# Patient Record
Sex: FEMALE | Race: WHITE | Employment: UNEMPLOYED | RURAL
[De-identification: names, ages, dates, MRNs, and addresses within clinical notes are randomized per-mention and may not be internally consistent; named-entity substitution may affect disease eponyms.]

---

## 2017-01-05 RX ORDER — POTASSIUM CHLORIDE 1500 MG/1
20 TABLET, FILM COATED, EXTENDED RELEASE ORAL DAILY
Qty: 30 TAB | Refills: 5 | Status: SHIPPED | OUTPATIENT
Start: 2017-01-05 | End: 2017-03-02

## 2017-01-17 ENCOUNTER — TELEPHONE (OUTPATIENT)
Dept: FAMILY MEDICINE CLINIC | Age: 72
End: 2017-01-17

## 2017-01-18 ENCOUNTER — TELEPHONE (OUTPATIENT)
Dept: FAMILY MEDICINE CLINIC | Age: 72
End: 2017-01-18

## 2017-01-26 ENCOUNTER — DOCUMENTATION ONLY (OUTPATIENT)
Dept: FAMILY MEDICINE CLINIC | Age: 72
End: 2017-01-26

## 2017-01-26 ENCOUNTER — TELEPHONE (OUTPATIENT)
Dept: FAMILY MEDICINE CLINIC | Age: 72
End: 2017-01-26

## 2017-01-26 NOTE — PROGRESS NOTES
Spoke with the patients / RN for her health insurance plan she stated Mrs Ang Gupta wants referral for hearing loss,podiatrist and wants to have a blood pressure cuff at home I told her she needs to discuss these issues with Dr Kelton Esquivel  And get referrals at that time it is required by the insurance that a face to face documentation note for all referrals. She stated to the doctor can charge a plan of care to her insurance using code #.  The nurse will get Mrs Ang Gupta to make appointment to discuss issues

## 2017-03-02 ENCOUNTER — OFFICE VISIT (OUTPATIENT)
Dept: FAMILY MEDICINE CLINIC | Age: 72
End: 2017-03-02

## 2017-03-02 VITALS
WEIGHT: 183.6 LBS | SYSTOLIC BLOOD PRESSURE: 110 MMHG | HEIGHT: 66 IN | HEART RATE: 99 BPM | BODY MASS INDEX: 29.51 KG/M2 | TEMPERATURE: 98.1 F | OXYGEN SATURATION: 98 % | RESPIRATION RATE: 16 BRPM | DIASTOLIC BLOOD PRESSURE: 66 MMHG

## 2017-03-02 DIAGNOSIS — R60.0 LOCALIZED EDEMA: ICD-10-CM

## 2017-03-02 DIAGNOSIS — C44.90 SKIN CANCER: ICD-10-CM

## 2017-03-02 DIAGNOSIS — I10 ESSENTIAL HYPERTENSION: Primary | ICD-10-CM

## 2017-03-02 DIAGNOSIS — M79.89 RIGHT LEG SWELLING: ICD-10-CM

## 2017-03-02 DIAGNOSIS — E78.5 HYPERLIPIDEMIA, UNSPECIFIED HYPERLIPIDEMIA TYPE: ICD-10-CM

## 2017-03-02 DIAGNOSIS — M17.9 OSTEOARTHRITIS OF KNEE, UNSPECIFIED LATERALITY, UNSPECIFIED OSTEOARTHRITIS TYPE: ICD-10-CM

## 2017-03-02 DIAGNOSIS — M81.0 OSTEOPOROSIS: ICD-10-CM

## 2017-03-02 DIAGNOSIS — F31.70 BIPOLAR AFFECTIVE DISORDER IN REMISSION (HCC): ICD-10-CM

## 2017-03-02 DIAGNOSIS — I87.2 STASIS DERMATITIS OF BOTH LEGS: ICD-10-CM

## 2017-03-02 DIAGNOSIS — L70.9 ADULT ACNE: ICD-10-CM

## 2017-03-02 DIAGNOSIS — R73.9 ELEVATED BLOOD SUGAR: ICD-10-CM

## 2017-03-02 DIAGNOSIS — L30.9 DERMATITIS: ICD-10-CM

## 2017-03-02 DIAGNOSIS — Z85.828 PERSONAL HISTORY OF SKIN CANCER: ICD-10-CM

## 2017-03-02 DIAGNOSIS — I87.2 VENOUS INSUFFICIENCY: ICD-10-CM

## 2017-03-02 RX ORDER — TRETINOIN 1 MG/G
CREAM TOPICAL
Qty: 20 G | Refills: 3 | Status: SHIPPED | OUTPATIENT
Start: 2017-03-02 | End: 2017-11-01 | Stop reason: SDUPTHER

## 2017-03-02 RX ORDER — ESZOPICLONE 2 MG/1
2 TABLET, FILM COATED ORAL
COMMUNITY
Start: 2017-02-27 | End: 2017-06-19

## 2017-03-02 RX ORDER — AMMONIUM LACTATE 12 G/100G
LOTION TOPICAL
Qty: 1 BOTTLE | Refills: 5 | Status: SHIPPED | OUTPATIENT
Start: 2017-03-02

## 2017-03-02 RX ORDER — POLYETHYLENE GLYCOL 3350 17 G/17G
POWDER, FOR SOLUTION ORAL
COMMUNITY
Start: 2017-01-23 | End: 2017-05-01

## 2017-03-02 RX ORDER — LISINOPRIL 20 MG/1
20 TABLET ORAL DAILY
Qty: 90 TAB | Refills: 1 | Status: SHIPPED | OUTPATIENT
Start: 2017-03-02 | End: 2018-03-30 | Stop reason: SDUPTHER

## 2017-03-02 RX ORDER — CYCLOSPORINE 0.5 MG/ML
EMULSION OPHTHALMIC
COMMUNITY
Start: 2017-01-09 | End: 2017-05-01

## 2017-03-02 RX ORDER — PREDNISOLONE ACETATE 10 MG/ML
SUSPENSION/ DROPS OPHTHALMIC
COMMUNITY
Start: 2017-02-04 | End: 2017-05-01

## 2017-03-02 RX ORDER — POTASSIUM CHLORIDE 20 MEQ/1
20 TABLET, EXTENDED RELEASE ORAL DAILY
Qty: 90 TAB | Refills: 1 | Status: SHIPPED | OUTPATIENT
Start: 2017-03-02 | End: 2018-03-28 | Stop reason: SDUPTHER

## 2017-03-02 RX ORDER — POTASSIUM CHLORIDE 20 MEQ/1
TABLET, EXTENDED RELEASE ORAL
COMMUNITY
Start: 2017-02-27 | End: 2017-03-02 | Stop reason: SDUPTHER

## 2017-03-02 RX ORDER — OFLOXACIN 3 MG/ML
SOLUTION/ DROPS OPHTHALMIC
COMMUNITY
Start: 2017-02-01 | End: 2017-03-02

## 2017-03-02 RX ORDER — MELATONIN
2000 DAILY
Qty: 180 TAB | Refills: 1 | Status: SHIPPED | OUTPATIENT
Start: 2017-03-02 | End: 2018-04-12 | Stop reason: SDUPTHER

## 2017-03-02 RX ORDER — DIPHENHYDRAMINE HCL 25 MG
600 TABLET,DISINTEGRATING ORAL DAILY
Qty: 90 CAP | Refills: 1 | Status: SHIPPED | OUTPATIENT
Start: 2017-03-02 | End: 2018-04-12

## 2017-03-02 RX ORDER — BISACODYL 5 MG
TABLET, DELAYED RELEASE (ENTERIC COATED) ORAL
COMMUNITY
Start: 2017-01-25 | End: 2017-05-01

## 2017-03-02 RX ORDER — HYDROCODONE BITARTRATE AND ACETAMINOPHEN 5; 325 MG/1; MG/1
TABLET ORAL
Qty: 60 TAB | Refills: 0 | Status: SHIPPED | OUTPATIENT
Start: 2017-03-02 | End: 2017-05-01 | Stop reason: SDUPTHER

## 2017-03-02 RX ORDER — TORSEMIDE 20 MG/1
TABLET ORAL
Qty: 10 TAB | Refills: 1 | Status: SHIPPED | OUTPATIENT
Start: 2017-03-02 | End: 2017-08-30 | Stop reason: SDUPTHER

## 2017-03-02 RX ORDER — SPIRONOLACTONE 50 MG/1
50 TABLET, FILM COATED ORAL DAILY
Qty: 90 TAB | Refills: 1 | Status: SHIPPED | OUTPATIENT
Start: 2017-03-02 | End: 2017-10-23 | Stop reason: SDUPTHER

## 2017-03-02 RX ORDER — DIPHENHYDRAMINE HCL 25 MG
TABLET,DISINTEGRATING ORAL
COMMUNITY
Start: 2017-01-20 | End: 2017-03-02 | Stop reason: SDUPTHER

## 2017-03-02 RX ORDER — MELATONIN
2000
COMMUNITY
End: 2017-03-02 | Stop reason: SDUPTHER

## 2017-03-02 NOTE — PATIENT INSTRUCTIONS
Continue current medications    Work on diet and exercise    Fasting Lab work this month    Keep planned follow up visit here, derm, GYN, vasc surgeon, psyc, orthop

## 2017-03-02 NOTE — MR AVS SNAPSHOT
Visit Information Date & Time Provider Department Dept. Phone Encounter #  
 3/2/2017  3:30 PM Paige Wells MD [address] [place] [address] [phone] [id number] Follow-up Instructions Return in about 6 months (around 9/2/2017). Upcoming Health Maintenance Date Due  
 GLAUCOMA SCREENING Q2Y 3/9/2010 FOBT Q 1 YEAR AGE 50-75 3/8/2017 INFLUENZA AGE 9 TO ADULT 4/30/2017* MEDICARE YEARLY EXAM 3/9/2017 BREAST CANCER SCRN MAMMOGRAM 9/6/2018 DTaP/Tdap/Td series (2 - Td) 2/18/2025 *Topic was postponed. The date shown is not the original due date. Allergies as of 3/2/2017  Review Complete On: 3/2/2017 By: Paige Wells MD  
  
 Severity Noted Reaction Type Reactions Levaquin [Levofloxacin]  06/10/2016    Other (comments) Muscle weakness Percocet [Oxycodone-acetaminophen]  05/20/2016    Other (comments)  
 halucinations Statins-hmg-coa Reductase Inhibitors  06/10/2016    Other (comments) Muscle weakness Current Immunizations  Reviewed on 3/5/2014 Name Date Influenza Vaccine 9/29/2015, 10/10/2013 Influenza Vaccine (Quad) 10/8/2014  4:53 PM  
 Influenza Vaccine (Quad) PF 10/27/2015 Pneumococcal Conjugate (PCV-13) 3/8/2016 Pneumococcal Polysaccharide (PPSV-23) 10/1/2014 Td 10/10/2013 Tdap 2/18/2015 Not reviewed this visit You Were Diagnosed With   
  
 Codes Comments Essential hypertension    -  Primary ICD-10-CM: I10 
ICD-9-CM: 401.9 Hyperlipidemia, unspecified hyperlipidemia type     ICD-10-CM: E78.5 ICD-9-CM: 272.4 Elevated blood sugar     ICD-10-CM: R73.9 ICD-9-CM: 790.29 Venous insufficiency     ICD-10-CM: I87.2 ICD-9-CM: 459.81 Bipolar affective disorder in remission [facility])     ICD-10-CM: F31.70 ICD-9-CM: 296.80 Osteoarthritis of knee, unspecified laterality, unspecified osteoarthritis type     ICD-10-CM: M17.9 ICD-9-CM: 715.36 Osteoporosis     ICD-10-CM: M81.0 ICD-9-CM: 733.00 Skin cancer     ICD-10-CM: C44.90 ICD-9-CM: 173.90 Dermatitis     ICD-10-CM: L30.9 ICD-9-CM: 692.9 Adult acne     ICD-10-CM: L70.9 ICD-9-CM: 706.1 Stasis dermatitis of both legs     ICD-10-CM: I83.11, I83.12 
ICD-9-CM: 454.1 Localized edema     ICD-10-CM: R60.0 ICD-9-CM: 735. 3 Right leg swelling     ICD-10-CM: M79.89 ICD-9-CM: 729.81 Personal history of skin cancer     ICD-10-CM: G94.710 ICD-9-CM: V10.83 Vitals BP  
  
  
  
  
  
 110/66 (BP 1 Location: Right arm, BP Patient Position: Sitting) BMI and BSA Data Body Mass Index Body Surface Area  
 29.63 kg/m 2 1.97 m 2 Preferred Pharmacy Pharmacy Name Phone [facility],[address], [address] [place] [address]  [phone] Your Updated Medication List  
  
   
This list is accurate as of: 3/2/17  4:39 PM.  Always use your most recent med list.  
  
  
  
  
 ammonium lactate 12 % lotion Commonly known as:  LAC-HYDRIN  
rub in to affected area well twice daily  
  
 bisacodyl 5 mg EC tablet Commonly known as:  DULCOLAX  
  
 calcium carbonate-vitamin D3 600 mg(1,500mg) -800 unit Tab Take 600 mg by mouth daily. cholecalciferol 1,000 unit tablet Commonly known as:  VITAMIN D3 Take 2 Tabs by mouth daily. conjugated estrogens 0.625 mg/gram vaginal cream  
Commonly known as:  PREMARIN Insert 0.5g vaginally on Monday and Thursday of each week. EFFEXOR  mg capsule Generic drug:  venlafaxine-SR Take  by mouth daily. eszopiclone 2 mg tablet Commonly known as:  LUNESTA  
  
 fenofibrate nanocrystallized 145 mg tablet Commonly known as:  TRICOR  
TAKE ONE TABLET BY MOUTH DAILY HYDROcodone-acetaminophen 5-325 mg per tablet Commonly known as:  Radha President One tab twice daily if needed  
  
 lisinopril 20 mg tablet Commonly known as:  Henry Shutters Take 1 Tab by mouth daily. polyethylene glycol 17 gram/dose powder Commonly known as:  MIRALAX  
  
 potassium chloride 20 mEq tablet Commonly known as:  K-DUR, KLOR-CON Take 1 Tab by mouth daily. prednisoLONE acetate 1 % ophthalmic suspension Commonly known as:  PRED FORTE  
  
 RESTASIS 0.05 % ophthalmic emulsion Generic drug:  cycloSPORINE  
  
 RITALIN 20 mg tablet Generic drug:  methylphenidate Take 20 mg by mouth two (2) times a day. rOPINIRole 1 mg tablet Commonly known as:  Annalee Ness Take 0.5 Tabs by mouth three (3) times daily. spironolactone 50 mg tablet Commonly known as:  ALDACTONE Take 1 Tab by mouth daily. torsemide 20 mg tablet Commonly known as:  DEMADEX Take 1 tab for leg swelling PRN Do not take more than 1 tab per week  Indications: Edema  
  
 tretinoin 0.1 % topical cream  
Commonly known as:  RETIN-A  
APPLY TO AFFECTED AREA EVERY NIGHT  
  
 VISTARIL 25 mg capsule Generic drug:  hydrOXYzine pamoate Take 25 mg by mouth three (3) times daily as needed for Itching. Prescriptions Printed Refills HYDROcodone-acetaminophen (NORCO) 5-325 mg per tablet 0 Sig: One tab twice daily if needed Class: Print  
 torsemide (DEMADEX) 20 mg tablet 1 Sig: Take 1 tab for leg swelling PRN Do not take more than 1 tab per week  Indications: Edema Class: Print Prescriptions Sent to Pharmacy Refills  
 ammonium lactate (LAC-HYDRIN) 12 % lotion 5 Sig: rub in to affected area well twice daily Class: Normal  
 Pharmacy: [address] [place] [address] Dr Ph #: [phone]  
 calcium carbonate-vitamin D3 600 mg(1,500mg) -800 unit tab 1 Sig: Take 600 mg by mouth daily. Class: Normal  
 Pharmacy: [address] [place] [address] Dr Ph #: [phone] Route: Oral  
 cholecalciferol (VITAMIN D3) 1,000 unit tablet 1 Sig: Take 2 Tabs by mouth daily. Class: Normal  
 Pharmacy: [address] [place] [address] Dr Ph #: [phone] Route: Oral  
 lisinopril (PRINIVIL, ZESTRIL) 20 mg tablet 1 Sig: Take 1 Tab by mouth daily. Class: Normal  
 Pharmacy: [address] [place] [address] Dr Ph #: [phone] Route: Oral  
 potassium chloride (K-DUR, KLOR-CON) 20 mEq tablet 1 Sig: Take 1 Tab by mouth daily. Class: Normal  
 Pharmacy: [address] [place] [address] Dr Ph #: [phone] Route: Oral  
 spironolactone (ALDACTONE) 50 mg tablet 1 Sig: Take 1 Tab by mouth daily. Class: Normal  
 Pharmacy: [address] [place] [address] Dr Ph #: [phone] Route: Oral  
 tretinoin (RETIN-A) 0.1 % topical cream 3 Sig: APPLY TO AFFECTED AREA EVERY NIGHT Class: Normal  
 Pharmacy: [address] [place] [address] Dr Ph #: [phone] Follow-up Instructions Return in about 6 months (around 9/2/2017). To-Do List   
 04/01/2017 Lab:  ALT   
  
 04/01/2017 Lab:  AST   
  
 04/01/2017 Lab:  CBC WITH AUTOMATED DIFF   
  
 04/01/2017 Lab:  HEMOGLOBIN A1C WITH EAG   
  
 04/01/2017 Lab:  LIPID PANEL   
  
 04/01/2017 Lab:  METABOLIC PANEL, BASIC   
  
 04/01/2017 Lab:  TSH   
  
 04/01/2017 Lab:  URINALYSIS W/ RFLX MICROSCOPIC   
  
 04/01/2017 Lab:  VITAMIN D, 25 HYDROXY Patient Instructions Continue current medications Work on diet and exercise Fasting Lab work this month Keep planned follow up visit here, derm, GYN, vasc surgeon, psyc, orthop Introducing [facility]! [facility] introduces [email or web address] patient portal. Now you can access parts of your medical record, email your doctor's office, and request medication refills online. 1. In your internet browser, go to [email or web address] 2. Click on the First Time User? Click Here link in the Sign In box. You will see the New Member Sign Up page. 3. Enter your [email or web address] Access Code exactly as it appears below.  You will not need to use this code after youve completed the sign-up process. If you do not sign up before the expiration date, you must request a new code. · LearnSomething Access Code: [email or web address] Expires: 3/19/2017  3:00 PM 
 
4. Enter the last four digits of your Social Security Number (xxxx) and Date of Birth (mm/dd/yyyy) as indicated and click Submit. You will be taken to the next sign-up page. 5. Create a LearnSomething ID. This will be your LearnSomething login ID and cannot be changed, so think of one that is secure and easy to remember. 6. Create a LearnSomething password. You can change your password at any time. 7. Enter your Password Reset Question and Answer. This can be used at a later time if you forget your password. 8. Enter your e-mail address. You will receive e-mail notification when new information is available in [address]. 9. Click Sign Up. You can now view and download portions of your medical record. 10. Click the Download Summary menu link to download a portable copy of your medical information. If you have questions, please visit the Frequently Asked Questions section of the LearnSomething website. Remember, LearnSomething is NOT to be used for urgent needs. For medical emergencies, dial 911. Now available from your iPhone and Android! Please provide this summary of care documentation to your next provider. Your primary care clinician is listed as Moira Osei. If you have any questions after today's visit, please call [phone].

## 2017-03-02 NOTE — PROGRESS NOTES
Chantel Aguirre is a 70 y.o. female who presents to the office today with the following:  Chief Complaint   Patient presents with    Medication Refill     wants #90 day supply of meds       Allergies   Allergen Reactions    Levaquin [Levofloxacin] Other (comments)     Muscle weakness    Percocet [Oxycodone-Acetaminophen] Other (comments)     halucinations      Statins-Hmg-Coa Reductase Inhibitors Other (comments)     Muscle weakness       Current Outpatient Prescriptions   Medication Sig    RESTASIS 0.05 % ophthalmic emulsion     eszopiclone (LUNESTA) 2 mg tablet     prednisoLONE acetate (PRED FORTE) 1 % ophthalmic suspension     ammonium lactate (LAC-HYDRIN) 12 % lotion rub in to affected area well twice daily    calcium carbonate-vitamin D3 600 mg(1,500mg) -800 unit tab Take 600 mg by mouth daily.  cholecalciferol (VITAMIN D3) 1,000 unit tablet Take 2 Tabs by mouth daily.  HYDROcodone-acetaminophen (NORCO) 5-325 mg per tablet One tab twice daily if needed    lisinopril (PRINIVIL, ZESTRIL) 20 mg tablet Take 1 Tab by mouth daily.  potassium chloride (K-DUR, KLOR-CON) 20 mEq tablet Take 1 Tab by mouth daily.  spironolactone (ALDACTONE) 50 mg tablet Take 1 Tab by mouth daily.  torsemide (DEMADEX) 20 mg tablet Take 1 tab for leg swelling PRN  Do not take more than 1 tab per week  Indications: Edema    tretinoin (RETIN-A) 0.1 % topical cream APPLY TO AFFECTED AREA EVERY NIGHT    methylphenidate (RITALIN) 20 mg tablet Take 20 mg by mouth two (2) times a day.  hydrOXYzine pamoate (VISTARIL) 25 mg capsule Take 25 mg by mouth three (3) times daily as needed for Itching.  rOPINIRole (REQUIP) 1 mg tablet Take 0.5 Tabs by mouth three (3) times daily.  conjugated estrogens (PREMARIN) 0.625 mg/gram vaginal cream Insert 0.5g vaginally on Monday and Thursday of each week.     fenofibrate nanocrystallized (TRICOR) 145 mg tablet TAKE ONE TABLET BY MOUTH DAILY    venlafaxine-SR (EFFEXOR XR) 150 mg capsule Take  by mouth daily.  bisacodyl (DULCOLAX) 5 mg EC tablet     polyethylene glycol (MIRALAX) 17 gram/dose powder      No current facility-administered medications for this visit. Past Medical History:   Diagnosis Date    Actinic keratosis     ADHD (attention deficit hyperactivity disorder)     Arthritis     Bipolar affective disorder ([facility] [address]     Dr. Aissatou Cortez Colon cancer [facility])     polyps on scope 2/17    Depression     GERD (gastroesophageal reflux disease)     Insomnia     Osteoporosis     Dexa 9/6/16       Past Surgical History:   Procedure Laterality Date    HX BACK SURGERY      HX COLECTOMY  1982    HX GI      HX HEENT      HX MALIGNANT SKIN LESION EXCISION         History   Smoking Status    Former Smoker    Packs/day: 1.00    Quit date: 3/5/1970   Smokeless Tobacco    Never Used       Family History   Problem Relation Age of Onset   Gladystine Mower Cancer Father      melanoma, larynx    Cancer Mother     Hypertension Mother     Cancer Maternal Aunt      pancreatic    Cancer Maternal Grandmother      pancreatic         History of Present Illness:  Patient here for ongoing medical follow-up    Patient with long and complex medical history patient with a history of chronic lower extremity edema and venous stasis. She has been evaluated by surgery in the past for this. She has had workup in the past including normal MARISSA exam.  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 She is currently on Spironolactone daily and as needed torsemide. She only needs torsemide once or twice a week. She has not been wearing support hose because they are difficult for her to put on . Akhil Sands She does complain of increasing swelling and irritation of her legs as the day goes on. History of venous ulcers but no current issues there. Since the last visit we did do a vascular ultrasound which did show some venous reflux. No evidence of DVTs.   We did have her seen by vascular surgery. I do not have that consult back yet. She was told that her veins were \"not that bad \". Surgery not indicated. It sounds like they are doing some additional workup ultrasounds kidney tests etc. to rule out other etiologies. She has a follow-up with them      She has hypertension. On lisinopril. No current cardiac complaints. Most recent basic metabolic profile normal.  She has no cardiac complaints. Blood pressure is in good control on her current regimen. She is willing to have some lab work today    She has a history of hyperlipidemia. Intolerant to statins. Currently on TriCor. Most recent lipid panel acceptable with normal LFTs. We are continuing her medication and getting lab work today    Patient does have anxiety/bipolar disease associated with insomnia and ADHD. She does followed with psychiatry. They currently have her on Effexor, Ritalin as needed, Lunesta and Vistaril. .  Overall she has been stable. She has ongoing follow-up with her psychiatrist    She has remote history of skin cancer. She has had multiple skin lesions removed over the years. She usually follows with dermatology but has not been seen there in a while. She plans to call and make that appointment. The dermatologist did start her on Retin-A which has helped. Her primary care providers had been writing for this. She does plan to discuss this with her dermatologist as well. She also has some venous stasis dermatitis lower extremities. She uses Lac-Hydrin for that. She did ask if I could refill that today    She has a history of a malignant polyp removed from her colon in 1982. She needs periodic surveillance colonoscopies. Since the last visit she did follow through with surgery. She just had a colonoscopy. I do not have report back but patient stated she was told the colonoscopy was normal and she can go 5 years before her next scope    She does have a history of osteoporosis.   Confirmed on recent DEXA scan 2016. She is on vitamin D replacement. Calcium was recently started. She was not felt to be an acceptable candidate for Fosamax as she has extensive dental work and is going to be undergoing extractions. She did ask if I could call in calcium and vitamin D to her pharmacy so that could be covered with her insurance plan. I did so. I am checking vitamin D levels    She does have restless leg syndrome treated with Requip. She states this works quite well. She has chronic back pain secondary to lumbar spondylosis. She has had surgery on her back in the past for disc disease. She does have ongoing pain in her back. She has been intolerant to multiple pain medications. She is currently on Norco which is prescribed for 3 times a day but she does not take it  that often. She has been seen by pain management in the past.  Injections were not helpful. She would prefer not to drive to [place] for pain management. She did ask for refill today. According to  last refill for 60 tablets was in November. I did okay 60 tablets no refills. Patient did sign a pain contract today. She is aware of risk of sedation and driving restrictions with narcotic pain medication      She does have osteoarthritis in her knees. She is currently following with orthopedist.  They did recently give her a cortisone shot. She is involved in exercise program and it hopes that this will improve her pain to allow her to stop her pain medications    Remote history of GERD no current complaints    She has a history of cataracts. She tells me they are contemplating cataract surgery    History of elevated BG without diabetes. Repeat labs/A1c ordered    She has not seen GYN in a while. She has postmenopausal symptoms of vaginal dryness and some incontinence. Currently on Premarin vaginal cream.  She does not feel this is helping. She is interested in the vaginal ring. She has an intact uterus.   She would need to go on a combination of estrogen and Provera. Given her other medical issues I am not comfortable doing this at this point. She is seen GYN in the past and tells me she plans to call and schedule an appointment with them to discuss further. She was going to do this after the last visit but has not had a chance to do that yet. She says she will    Recent mammogram September 2016 normal    She has had a tetanus booster in 2005. She has had 2 pneumonia shots. She has had the flu shot this year. She recently got the Zostavax        Review of Systems:      Review of systems negative except as noted above    Physical Exam:  Visit Vitals    /66 (BP 1 Location: Right arm, BP Patient Position: Sitting)    Pulse 99    Temp 98.1 °F (36.7 °C) (Oral)    Resp 16    Ht 5' 6\" (1.676 m)    Wt 183 lb 9.6 oz (83.3 kg)    SpO2 98%    BMI 29.63 kg/m2     Vitals:    03/02/17 1552   BP: 110/66   BP 1 Location: Right arm   BP Patient Position: Sitting   Pulse: 99   Resp: 16   Temp: 98.1 °F (36.7 °C)   TempSrc: Oral   SpO2: 98%   Weight: 183 lb 9.6 oz (83.3 kg)   Height: 5' 6\" (1.676 m)    Patient no acute distress vital signs stable as above. Head is normocephalic  Patient dressed appropriately. Good eye contact. Did not appear anxious or distressed. No psychotic or suicidal ideations  External ears normal.  Ear canals normal.  TMs were clear  Eyes PERRLA. EOMs full. Sclera clear. Patient seeing her eye doctor regularly  Nose within normal limits. Nares  normal.  No significant congestion  OP mucosa normal, no obvious lesions. Pharynx normal.  No erythema or exudate. Structures midline. Poor dentition. Patient following with dentist  Neck no nodes no masses no bruits. No goiter  Chest was clear no wheezes rhonchi or rales. Good air exchange  Cor regular rate and rhythm no S3-S4 no murmurs  Abdomen obese no HSM no masses soft and was nontender  Low back revealed midline scar from previous surgery. Tenderness to palpation paraspinous muscles. Negative sitting straight leg raising sign  Extremities had trace pretibial edema. Some dependent rubor noted. Good pedal pulses and capillary refill. Dry skin but no ulcers        Complex patient with multiple medical issues. Greater than 40 minutes were spent in discussion with patient, exam and reviewing and refilling her medications. Overall she is medically stable at this point. She is going to continue her current medications. She will follow-up with all the above providers. I have ordered some additional lab work today. If all goes well I will see her back in 6 months  1. Essential hypertension  We will continue current medications. She did request a prescription for home blood pressure cuff and I wrote for this  - METABOLIC PANEL, BASIC; Future  - CBC WITH AUTOMATED DIFF; Future  - URINALYSIS W/ RFLX MICROSCOPIC; Future  - lisinopril (PRINIVIL, ZESTRIL) 20 mg tablet; Take 1 Tab by mouth daily. Dispense: 90 Tab; Refill: 1  - potassium chloride (K-DUR, KLOR-CON) 20 mEq tablet; Take 1 Tab by mouth daily. Dispense: 90 Tab; Refill: 1    2. Hyperlipidemia, unspecified hyperlipidemia type    - LIPID PANEL; Future  - AST; Future  - ALT; Future  - TSH; Future    3. Elevated blood sugar    - HEMOGLOBIN A1C WITH EAG; Future    4. Venous insufficiency  Patient will follow with vascular surgery  - ammonium lactate (LAC-HYDRIN) 12 % lotion; rub in to affected area well twice daily  Dispense: 1 Bottle; Refill: 5    5. Bipolar affective disorder in remission [facility])  Patient will follow with her psychiatrist    6. Osteoarthritis of knee, unspecified laterality, unspecified osteoarthritis type  Patient will follow with her orthopedist.    - HYDROcodone-acetaminophen (Vivienne Betts) 5-325 mg per tablet; One tab twice daily if needed  Dispense: 60 Tab; Refill: 0    7.  Osteoporosis  Continue current medications  - VITAMIN D, 25 HYDROXY; Future  - calcium carbonate-vitamin D3 600 mg(1,500mg) -800 unit tab; Take 600 mg by mouth daily. Dispense: 90 Cap; Refill: 1  - cholecalciferol (VITAMIN D3) 1,000 unit tablet; Take 2 Tabs by mouth daily. Dispense: 180 Tab; Refill: 1    8. Skin cancer  Patient states she is going to call and follow with her dermatologist    9. Dermatitis  **I have refilled her Lac-Hydrin as well as her tretinoin    10. Adult acne      11. Stasis dermatitis of both legs    - ammonium lactate (LAC-HYDRIN) 12 % lotion; rub in to affected area well twice daily  Dispense: 1 Bottle; Refill: 5    12. Localized edema  We will continue current medications  - spironolactone (ALDACTONE) 50 mg tablet; Take 1 Tab by mouth daily. Dispense: 90 Tab; Refill: 1  - torsemide (DEMADEX) 20 mg tablet; Take 1 tab for leg swelling PRN  Do not take more than 1 tab per week  Indications: Edema  Dispense: 10 Tab; Refill: 1    14. Personal history of skin cancer    - tretinoin (RETIN-A) 0.1 % topical cream; APPLY TO AFFECTED AREA EVERY NIGHT  Dispense: 20 g; Refill: 3      Patient Instructions   Continue current medications    Work on diet and exercise    Fasting Lab work this month    Keep planned follow up visit here, derm, GYN, vasc surgeon, psyc, orthop          Continue current therapy plan except for indicated above. Verbal and written instructions (see AVS) provided.  Patient expresses understanding of diagnosis and treatment plan. Follow-up Disposition:  Return in about 6 months (around 9/2/2017). Evelyn Remy.  Asya Herring MD

## 2017-05-01 ENCOUNTER — TELEPHONE (OUTPATIENT)
Dept: FAMILY MEDICINE CLINIC | Age: 72
End: 2017-05-01

## 2017-05-01 ENCOUNTER — OFFICE VISIT (OUTPATIENT)
Dept: FAMILY MEDICINE CLINIC | Age: 72
End: 2017-05-01

## 2017-05-01 VITALS
OXYGEN SATURATION: 94 % | HEIGHT: 66 IN | DIASTOLIC BLOOD PRESSURE: 70 MMHG | RESPIRATION RATE: 16 BRPM | BODY MASS INDEX: 29.35 KG/M2 | SYSTOLIC BLOOD PRESSURE: 110 MMHG | WEIGHT: 182.6 LBS | TEMPERATURE: 96.7 F | HEART RATE: 91 BPM

## 2017-05-01 DIAGNOSIS — M54.50 CHRONIC MIDLINE LOW BACK PAIN WITHOUT SCIATICA: ICD-10-CM

## 2017-05-01 DIAGNOSIS — L03.115 CELLULITIS OF RIGHT LOWER EXTREMITY: Primary | ICD-10-CM

## 2017-05-01 DIAGNOSIS — M17.9 OSTEOARTHRITIS OF KNEE, UNSPECIFIED LATERALITY, UNSPECIFIED OSTEOARTHRITIS TYPE: ICD-10-CM

## 2017-05-01 DIAGNOSIS — G89.29 CHRONIC MIDLINE LOW BACK PAIN WITHOUT SCIATICA: ICD-10-CM

## 2017-05-01 RX ORDER — ESTRADIOL 2 MG/1
2 RING VAGINAL
COMMUNITY
Start: 2017-03-30

## 2017-05-01 RX ORDER — HYDROCODONE BITARTRATE AND ACETAMINOPHEN 5; 325 MG/1; MG/1
TABLET ORAL
Qty: 45 TAB | Refills: 0 | Status: SHIPPED | OUTPATIENT
Start: 2017-05-01 | End: 2017-05-25 | Stop reason: SDUPTHER

## 2017-05-01 RX ORDER — DOXYCYCLINE 100 MG/1
100 TABLET ORAL 2 TIMES DAILY
Qty: 20 TAB | Refills: 0 | Status: SHIPPED | OUTPATIENT
Start: 2017-05-01 | End: 2017-05-11

## 2017-05-01 RX ORDER — CYCLOSPORINE 0.5 MG/ML
EMULSION OPHTHALMIC
COMMUNITY
Start: 2017-01-02 | End: 2018-01-02

## 2017-05-01 NOTE — PATIENT INSTRUCTIONS
Finish antibiotics  Keep elevated  Follow up if not improving    Continue current medications  Try and taper off Norco    Work on diet and exercise        Keep planned follow up visit

## 2017-05-01 NOTE — TELEPHONE ENCOUNTER
Called pt and she stated she was cleaning out trash outside, crab pots ect. Saturday and got several cuts and puncture wounds on her leg. Areas \"look very angry\" red, closed and not hot. She wanted an antibiotic or she would drive here this afternoon if you would like her too. This writer asked if she thought she could wait till tomorrow and she didn't think she could. Please advise.

## 2017-05-01 NOTE — PROGRESS NOTES
Cristina Dean is a 67 y.o. female who presents to the office today with the following:  Chief Complaint   Patient presents with    Puncture Wound     Both Legs. Allergies   Allergen Reactions    Levaquin [Levofloxacin] Other (comments)     Muscle weakness    Percocet [Oxycodone-Acetaminophen] Other (comments)     halucinations      Statins-Hmg-Coa Reductase Inhibitors Other (comments)     Muscle weakness       Current Outpatient Prescriptions   Medication Sig    ESTRING 2 mg (7.5 mcg /24 hour) vaginal ring Insert 2 mg into vagina.  cycloSPORINE (RESTASIS) 0.05 % ophthalmic emulsion RESTASIS 0.05 % EMUL    doxycycline (ADOXA) 100 mg tablet Take 1 Tab by mouth two (2) times a day for 10 days.  HYDROcodone-acetaminophen (NORCO) 5-325 mg per tablet One tab twice daily  Only if needed    eszopiclone (LUNESTA) 2 mg tablet     ammonium lactate (LAC-HYDRIN) 12 % lotion rub in to affected area well twice daily    calcium carbonate-vitamin D3 600 mg(1,500mg) -800 unit tab Take 600 mg by mouth daily.  cholecalciferol (VITAMIN D3) 1,000 unit tablet Take 2 Tabs by mouth daily.  lisinopril (PRINIVIL, ZESTRIL) 20 mg tablet Take 1 Tab by mouth daily.  potassium chloride (K-DUR, KLOR-CON) 20 mEq tablet Take 1 Tab by mouth daily.  spironolactone (ALDACTONE) 50 mg tablet Take 1 Tab by mouth daily.  torsemide (DEMADEX) 20 mg tablet Take 1 tab for leg swelling PRN  Do not take more than 1 tab per week  Indications: Edema    tretinoin (RETIN-A) 0.1 % topical cream APPLY TO AFFECTED AREA EVERY NIGHT    methylphenidate (RITALIN) 20 mg tablet Take 20 mg by mouth two (2) times a day.  hydrOXYzine pamoate (VISTARIL) 25 mg capsule Take 25 mg by mouth three (3) times daily as needed for Itching.  rOPINIRole (REQUIP) 1 mg tablet Take 0.5 Tabs by mouth three (3) times daily.     fenofibrate nanocrystallized (TRICOR) 145 mg tablet TAKE ONE TABLET BY MOUTH DAILY    venlafaxine-SR (EFFEXOR XR) 150 mg capsule Take  by mouth daily. No current facility-administered medications for this visit. Past Medical History:   Diagnosis Date    Actinic keratosis     ADHD (attention deficit hyperactivity disorder)     Arthritis     Bipolar affective disorder ([facility] [address]     Dr. Tamar Lucio Colon cancer [facility])     polyps on scope 2/17    Depression     GERD (gastroesophageal reflux disease)     Insomnia     Osteoporosis     Dexa 9/6/16       Past Surgical History:   Procedure Laterality Date    HX BACK SURGERY      HX COLECTOMY  1982    HX GI      HX HEENT      HX MALIGNANT SKIN LESION EXCISION         History   Smoking Status    Former Smoker    Packs/day: 1.00    Quit date: 3/5/1970   Smokeless Tobacco    Never Used       Family History   Problem Relation Age of Onset   Chyrl Iredell Cancer Father      melanoma, larynx    Cancer Mother     Hypertension Mother     Cancer Maternal Aunt      pancreatic    Cancer Maternal Grandmother      pancreatic         History of Present Illness:  Patient here for evaluation leg injury as well as medication refill and some discussion    Over the weekend patient was in the [place] working with her crab pots. She got poked several times in her right leg with a metal on the crab pot. Over the weekend she developed redness and swelling of her right anterior shin. It actually looks some better today but is still red. No significant pain. No drainage. She has had 2 tetanus shots within the last 10 years. No known problem with doxycycline previous patient does have chronic lower extremity edema. Extensive workup. She has been seen by vascular surgery. She tells me they did not find any specific cause for her edema. They suggest she discuss her medications with me in case there were any that could be causing edema.   I do not see any on quick look but I want to spend some time reviewing all her medications and I will get back to her if I find any that I think need to be adjusted    She does has chronic pain. This is DJD in her knee as well as chronic back pain. She has been to physical therapy she is had injections in the past.  She has been seen by pain management. She does take Norco On an occasional basis. She received 60 tablets 2 months ago. .  This was confirmed today with review of her . She is willing to sign pain contract. She is aware of the risks and precautions with these medications. She has not taken any medication in over 24 hours. I will not do a urine test today in light of that. She is aware we are going to try and slowly taper her down. She already is not using the amount prescribed in a monthly basis so I have written for 45 tablets    Patient already scheduled to see me back again in September for ongoing follow-up      Review of Systems:    Review of systems negative except as noted above      Physical Exam:  Visit Vitals    /70 (BP 1 Location: Left arm, BP Patient Position: Sitting)    Pulse 91    Temp 96.7 °F (35.9 °C) (Temporal)    Resp 16    Ht 5' 6\" (1.676 m)    Wt 182 lb 9.6 oz (82.8 kg)    SpO2 94%    BMI 29.47 kg/m2     Vitals:    05/01/17 1433   BP: 110/70   BP 1 Location: Left arm   BP Patient Position: Sitting   Pulse: 91   Resp: 16   Temp: 96.7 °F (35.9 °C)   TempSrc: Temporal   SpO2: 94%   Weight: 182 lb 9.6 oz (82.8 kg)   Height: 5' 6\" (1.676 m)     Patient was in no acute distress. Vital signs stable. Afebrile  Chest was clear  Cor regular rate and rhythm  Both extremity's had some trace pedal edema which is her baseline  No calf tenderness. Negative Homans sign  Good capillary refill  Right anterior shin did have several scabbed puncture wounds. There was some erythema of the shin mostly occurring below the site of the puncture wounds  No red streaks up the leg  No inguinal adenopathy    Assessment/Plan:  1.  Cellulitis of right lower extremity  Patient with puncture wounds of her leg that occurred in the [place] with some erythema. It is already improving but in light of the nature of the wound I do want to cover with antibiotics. Will use doxycycline. I recommended elevation. She will let me know if her symptoms are not improving  - doxycycline (ADOXA) 100 mg tablet; Take 1 Tab by mouth two (2) times a day for 10 days. Dispense: 20 Tab; Refill: 0    2. Chronic midline low back pain without sciatica      3. Osteoarthritis of knee, unspecified laterality, unspecified osteoarthritis type    - HYDROcodone-acetaminophen (NORCO) 5-325 mg per tablet; One tab twice daily  Only if needed  Dispense: 45 Tab; Refill: 0    4. Edema. Patient will continue her current medications. I will review these and get back to her if I think any changes need to be made. I stressed decrease salt and recommending elevation of her legs. She will keep her previously plan follow-up    I did review her medications. I do not think any of her current medications are causing her edema. Premarin can give you some fluid retention but she is on low-dose of vaginal cream only a couple times a week. Requip can cause edema however I spoke with her tonight and she confirms that she has been on Requip for a year. She has had swelling prior to that. It has not gotten worse with the Requip. She very much wants to continue with the Requip      Continue current therapy plan except for indicated above. Verbal and written instructions (see AVS) provided.  Patient expresses understanding of diagnosis and treatment plan. Follow-up Disposition:  Return if symptoms worsen or fail to improve. Ollie Aguilar.  Kaylah Márquez MD

## 2017-05-01 NOTE — MR AVS SNAPSHOT
Visit Information Date & Time Provider Department Dept. Phone Encounter #  
 5/1/2017  2:30 PM Randi Corona MD [facility] 86 [id number] Follow-up Instructions Return if symptoms worsen or fail to improve. Follow-up and Disposition History Your Appointments 5/2/2017  3:00 PM  
Any with Jose Luis Erazo PA-C  
[facility] ([facility]) Appt Note: scratch right leg on crab pot/ maybe needs testanus shot 5/1/17 [facility] [address] [phone]  
[phone]  
  
   
 [address] [address] Upcoming Health Maintenance Date Due FOBT Q 1 YEAR AGE 50-75 3/8/2017 MEDICARE YEARLY EXAM 3/9/2017 INFLUENZA AGE 9 TO ADULT 8/1/2017 BREAST CANCER SCRN MAMMOGRAM 9/6/2018 GLAUCOMA SCREENING Q2Y 2/1/2019 DTaP/Tdap/Td series (2 - Td) 2/18/2025 Allergies as of 5/1/2017  Review Complete On: 5/1/2017 By: Randi Corona MD  
  
 Severity Noted Reaction Type Reactions Levaquin [Levofloxacin]  06/10/2016    Other (comments) Muscle weakness Percocet [Oxycodone-acetaminophen]  05/20/2016    Other (comments)  
 halucinations Statins-hmg-coa Reductase Inhibitors  06/10/2016    Other (comments) Muscle weakness Current Immunizations  Reviewed on 3/5/2014 Name Date Influenza Vaccine 9/29/2015, 10/10/2013 Influenza Vaccine (Quad) 10/8/2014  4:53 PM  
 Influenza Vaccine (Quad) PF 10/27/2015 Pneumococcal Conjugate (PCV-13) 3/8/2016 Pneumococcal Polysaccharide (PPSV-23) 10/1/2014 Td 10/10/2013 Tdap 2/18/2015 Not reviewed this visit You Were Diagnosed With   
  
 Codes Comments Cellulitis of right lower extremity    -  Primary ICD-10-CM: F43.020 ICD-9-CM: 246. 6 Chronic midline low back pain without sciatica     ICD-10-CM: M54.5, G89.29 ICD-9-CM: 724.2, 338.29   
 Osteoarthritis of knee, unspecified laterality, unspecified osteoarthritis type     ICD-10-CM: M17.10 ICD-9-CM: 715.36 Vitals BP Pulse Temp Resp Height(growth percentile) 110/70 (BP 1 Location: Left arm, BP Patient Position: Sitting) 91 96.7 °F (35.9 °C) (Temporal) 16 5' 6\" (1.676 m) Weight(growth percentile) SpO2 BMI OB Status Smoking Status 182 lb 9.6 oz (82.8 kg) 94% 29.47 kg/m2 Postmenopausal Former Smoker BMI and BSA Data Body Mass Index Body Surface Area  
 29.47 kg/m 2 1.96 m 2 Preferred Pharmacy Pharmacy Name Phone [facility],[address], [address] [place] [address]  [phone] Your Updated Medication List  
  
   
This list is accurate as of: 5/1/17  3:27 PM.  Always use your most recent med list.  
  
  
  
  
 ammonium lactate 12 % lotion Commonly known as:  LAC-HYDRIN  
rub in to affected area well twice daily  
  
 calcium carbonate-vitamin D3 600 mg(1,500mg) -800 unit Tab Take 600 mg by mouth daily. cholecalciferol 1,000 unit tablet Commonly known as:  VITAMIN D3 Take 2 Tabs by mouth daily. doxycycline 100 mg tablet Commonly known as:  ADOXA Take 1 Tab by mouth two (2) times a day for 10 days. EFFEXOR  mg capsule Generic drug:  venlafaxine-SR Take  by mouth daily. ESTRING 2 mg (7.5 mcg /24 hour) vaginal ring Generic drug:  estradiol Insert 2 mg into vagina.  
  
 eszopiclone 2 mg tablet Commonly known as:  LUNESTA  
  
 fenofibrate nanocrystallized 145 mg tablet Commonly known as:  TRICOR  
TAKE ONE TABLET BY MOUTH DAILY HYDROcodone-acetaminophen 5-325 mg per tablet Commonly known as:  Rozann Palomo One tab twice daily  Only if needed  
  
 lisinopril 20 mg tablet Commonly known as:  Walker Ku Take 1 Tab by mouth daily. potassium chloride 20 mEq tablet Commonly known as:  K-DUR, KLOR-CON Take 1 Tab by mouth daily. RESTASIS 0.05 % ophthalmic emulsion Generic drug:  cycloSPORINE  
RESTASIS 0.05 % EMUL  
  
 RITALIN 20 mg tablet Generic drug:  methylphenidate Take 20 mg by mouth two (2) times a day. rOPINIRole 1 mg tablet Commonly known as:  Shabbir Phoenix Take 0.5 Tabs by mouth three (3) times daily. spironolactone 50 mg tablet Commonly known as:  ALDACTONE Take 1 Tab by mouth daily. torsemide 20 mg tablet Commonly known as:  DEMADEX Take 1 tab for leg swelling PRN Do not take more than 1 tab per week  Indications: Edema  
  
 tretinoin 0.1 % topical cream  
Commonly known as:  RETIN-A  
APPLY TO AFFECTED AREA EVERY NIGHT  
  
 VISTARIL 25 mg capsule Generic drug:  hydrOXYzine pamoate Take 25 mg by mouth three (3) times daily as needed for Itching. Prescriptions Printed Refills HYDROcodone-acetaminophen (NORCO) 5-325 mg per tablet 0 Sig: One tab twice daily  Only if needed Class: Print Prescriptions Sent to Pharmacy Refills  
 doxycycline (ADOXA) 100 mg tablet 0 Sig: Take 1 Tab by mouth two (2) times a day for 10 days. Class: Normal  
 Pharmacy: [address] [place] [address],[address], [address] [place] [address] Dr Simmons #: [phone] Route: Oral  
  
Follow-up Instructions Return if symptoms worsen or fail to improve. Patient Instructions Finish antibiotics Keep elevated Follow up if not improving Continue current medications Try and taper off Norco 
 
Work on diet and exercise Keep planned follow up visit Patient Instructions History Introducing [address]! Pamela Quintana introduces [employer] patient portal. Now you can access parts of your medical record, email your doctor's office, and request medication refills online. 1. In your internet browser, go to [email or web address] 2. Click on the First Time User? Click Here link in the Sign In box. You will see the New Member Sign Up page. 3. Enter your [employer] Access Code exactly as it appears below. You will not need to use this code after youve completed the sign-up process. If you do not sign up before the expiration date, you must request a new code. · [employer] Access Code: [email or web address] Expires: 6/2/2017 12:39 PM 
 
4. Enter the last four digits of your Social Security Number (xxxx) and Date of Birth (mm/dd/yyyy) as indicated and click Submit. You will be taken to the next sign-up page. 5. Create a [employer] ID. This will be your [employer] login ID and cannot be changed, so think of one that is secure and easy to remember. 6. Create a [employer] password. You can change your password at any time. 7. Enter your Password Reset Question and Answer. This can be used at a later time if you forget your password. 8. Enter your e-mail address. You will receive e-mail notification when new information is available in [address] 9. Click Sign Up. You can now view and download portions of your medical record. 10. Click the Download Summary menu link to download a portable copy of your medical information. If you have questions, please visit the Frequently Asked Questions section of the [employer] website. Remember, [employer] is NOT to be used for urgent needs. For medical emergencies, dial 911. Now available from your iPhone and Android! Please provide this summary of care documentation to your next provider. Your primary care clinician is listed as Heidi Arciniega. If you have any questions after today's visit, please call [phone].

## 2017-05-17 ENCOUNTER — TELEPHONE (OUTPATIENT)
Dept: FAMILY MEDICINE CLINIC | Age: 72
End: 2017-05-17

## 2017-05-17 RX ORDER — HYDROXYZINE PAMOATE 25 MG/1
CAPSULE ORAL
COMMUNITY
Start: 2017-02-21 | End: 2017-05-17

## 2017-05-17 RX ORDER — FLUCONAZOLE 150 MG/1
150 TABLET ORAL DAILY
Qty: 1 TAB | Refills: 0 | Status: SHIPPED | OUTPATIENT
Start: 2017-05-17 | End: 2017-05-18

## 2017-05-17 NOTE — TELEPHONE ENCOUNTER
Patient with symptoms of yeast infection  We will treat with Diflucan ×1 dose    Has been called the patient's pharmacy  She should follow-up if her symptoms persist

## 2017-05-25 DIAGNOSIS — M17.9 OSTEOARTHRITIS OF KNEE, UNSPECIFIED LATERALITY, UNSPECIFIED OSTEOARTHRITIS TYPE: ICD-10-CM

## 2017-05-30 RX ORDER — HYDROCODONE BITARTRATE AND ACETAMINOPHEN 5; 325 MG/1; MG/1
TABLET ORAL
Qty: 45 TAB | Refills: 0 | Status: SHIPPED | OUTPATIENT
Start: 2017-05-30 | End: 2017-06-27 | Stop reason: SDUPTHER

## 2017-05-30 NOTE — TELEPHONE ENCOUNTER
Request for [place] refilled #[phone] no refills  Please bring me the printed prescription as well as  for review  Please contact patient and remind her the maximum dose at this point is 45 tablets a month/1-1/2 tablets daily  Her next refill will not be due until end of June/first part of July

## 2017-06-27 ENCOUNTER — OFFICE VISIT (OUTPATIENT)
Dept: FAMILY MEDICINE CLINIC | Age: 72
End: 2017-06-27

## 2017-06-27 VITALS
SYSTOLIC BLOOD PRESSURE: 116 MMHG | RESPIRATION RATE: 16 BRPM | WEIGHT: 179.2 LBS | HEART RATE: 96 BPM | DIASTOLIC BLOOD PRESSURE: 75 MMHG | BODY MASS INDEX: 28.92 KG/M2 | TEMPERATURE: 98.5 F | OXYGEN SATURATION: 97 %

## 2017-06-27 DIAGNOSIS — M43.10 DEGENERATIVE SPONDYLOLISTHESIS: ICD-10-CM

## 2017-06-27 DIAGNOSIS — I10 ESSENTIAL HYPERTENSION: ICD-10-CM

## 2017-06-27 DIAGNOSIS — M17.9 OSTEOARTHRITIS OF KNEE, UNSPECIFIED LATERALITY, UNSPECIFIED OSTEOARTHRITIS TYPE: ICD-10-CM

## 2017-06-27 DIAGNOSIS — Z00.00 MEDICARE ANNUAL WELLNESS VISIT, SUBSEQUENT: Primary | ICD-10-CM

## 2017-06-27 DIAGNOSIS — E83.52 HYPERCALCEMIA: ICD-10-CM

## 2017-06-27 DIAGNOSIS — N95.2 POSTMENOPAUSAL ATROPHIC VAGINITIS: ICD-10-CM

## 2017-06-27 DIAGNOSIS — E78.5 HYPERLIPIDEMIA, UNSPECIFIED HYPERLIPIDEMIA TYPE: ICD-10-CM

## 2017-06-27 DIAGNOSIS — M81.0 OSTEOPOROSIS WITHOUT CURRENT PATHOLOGICAL FRACTURE, UNSPECIFIED OSTEOPOROSIS TYPE: ICD-10-CM

## 2017-06-27 DIAGNOSIS — R73.9 ELEVATED BLOOD SUGAR: ICD-10-CM

## 2017-06-27 RX ORDER — HYDROCODONE BITARTRATE AND ACETAMINOPHEN 5; 325 MG/1; MG/1
TABLET ORAL
Qty: 30 TAB | Refills: 0
Start: 2017-06-27 | End: 2017-07-07 | Stop reason: SDUPTHER

## 2017-06-27 NOTE — PROGRESS NOTES
Chief Complaint   Patient presents with   [facility] Wellness Visit            Kaleigh Shrestha is a 67 y.o. female and presents for annual Medicare Wellness Visit. Problem List: Reviewed with patient and discussed risk factors.     Patient Active Problem List   Diagnosis Code    Raynaud's disease I73.00    Colon cancer ([facility] [address] C18.9    Bilateral leg pain M79.604, M79.605    Vitamin deficiency syndrome E56.9    Vitamin deficiency E56.9    Stasis dermatitis of both legs I87.2    DJD (degenerative joint disease), lumbosacral M51.37    Venous insufficiency I87.2    Chronic pain G89.29    ACP (advance care planning) Z71.89    Constipation K59.00    Degenerative spondylolisthesis M43.10    Edema R60.9    Hypertension I10    Osteoarthritis of knee M17.10    Osteoporosis M81.0    Insomnia G47.00    GERD (gastroesophageal reflux disease) K21.9    Essential hypertension I10    Localized edema R60.0    Elevated blood sugar R73.9    Malignant neoplasm of colon (HCC) C18.9    Hyperlipidemia E78.5    Bipolar affective disorder in remission ([facility] [address] F31.70    Attention deficit hyperactivity disorder (ADHD) F90.9    Skin cancer C44.90    Postmenopausal atrophic vaginitis N95.2    Adult acne L70.9    Dermatitis L30.9       Current medical providers:  Patient Care Team:  Sherryle Brochure, MD as PCP - General (Family Practice)    PSH: Reviewed with patient  Past Surgical History:   Procedure Laterality Date    HX BACK SURGERY      HX COLECTOMY  1982    HX GI      HX HEENT      HX MALIGNANT SKIN LESION EXCISION          SH: Reviewed with patient  Social History   Substance Use Topics    Smoking status: Former Smoker     Packs/day: 1.00     Quit date: 3/5/1970    Smokeless tobacco: Never Used    Alcohol use 0.0 oz/week     2 - 3 Glasses of wine per week      Comment: per week       FH: Reviewed with patient  Family History   Problem Relation Age of Onset    Cancer Father      melanoma, larynx    Cancer Mother     Hypertension Mother     Cancer Maternal Aunt      pancreatic    Cancer Maternal Grandmother      pancreatic       Medications/Allergies: Reviewed with patient  Current Outpatient Prescriptions on File Prior to Visit   Medication Sig Dispense Refill    eszopiclone (LUNESTA) 2 mg tablet Take 1 Tab by mouth nightly as needed for Sleep. Max Daily Amount: 2 mg. 30 Tab 5    venlafaxine-SR (EFFEXOR XR) 150 mg capsule Take 1 Cap by mouth two (2) times a day. 60 Cap 5    methylphenidate 20 mg SC40 Take 20 mg by mouth two (2) times a dayIndications: ATTENTION-DEFICIT HYPERACTIVITY DISORDER. Max Daily Amount: 40 mg 60 Cap 0    HYDROcodone-acetaminophen (NORCO) 5-325 mg per tablet One tab twice daily  Only if needed 45 Tab 0    ESTRING 2 mg (7.5 mcg /24 hour) vaginal ring Insert 2 mg into vagina.  cycloSPORINE (RESTASIS) 0.05 % ophthalmic emulsion RESTASIS 0.05 % EMUL      ammonium lactate (LAC-HYDRIN) 12 % lotion rub in to affected area well twice daily 1 Bottle 5    calcium carbonate-vitamin D3 600 mg(1,500mg) -800 unit tab Take 600 mg by mouth daily. 90 Cap 1    cholecalciferol (VITAMIN D3) 1,000 unit tablet Take 2 Tabs by mouth daily. 180 Tab 1    lisinopril (PRINIVIL, ZESTRIL) 20 mg tablet Take 1 Tab by mouth daily. 90 Tab 1    potassium chloride (K-DUR, KLOR-CON) 20 mEq tablet Take 1 Tab by mouth daily. 90 Tab 1    spironolactone (ALDACTONE) 50 mg tablet Take 1 Tab by mouth daily. 90 Tab 1    torsemide (DEMADEX) 20 mg tablet Take 1 tab for leg swelling PRN  Do not take more than 1 tab per week  Indications: Edema 10 Tab 1    tretinoin (RETIN-A) 0.1 % topical cream APPLY TO AFFECTED AREA EVERY NIGHT 20 g 3    rOPINIRole (REQUIP) 1 mg tablet Take 0.5 Tabs by mouth three (3) times daily. (Patient taking differently: Take 0.5 mg by mouth three (3) times daily.  Indications: Restless Legs Syndrome, pt takes 1mg QHS) 90 Tab 5    fenofibrate nanocrystallized (TRICOR) 145 mg tablet TAKE ONE TABLET BY MOUTH DAILY 90 Tab 1     No current facility-administered medications on file prior to visit. Allergies   Allergen Reactions    Levaquin [Levofloxacin] Other (comments)     Muscle weakness    Percocet [Oxycodone-Acetaminophen] Other (comments)     halucinations      Statins-Hmg-Coa Reductase Inhibitors Other (comments)     Muscle weakness       Objective:  Visit Vitals    /75 (BP 1 Location: Left arm, BP Patient Position: Sitting)    Pulse 96    Temp 98.5 °F (36.9 °C) (Oral)    Resp 16    Wt 179 lb 3.2 oz (81.3 kg)    SpO2 97%    BMI 28.92 kg/m2    Body mass index is 28.92 kg/(m^2). Assessment of cognitive impairment: Alert and oriented x 3    Depression Screen:   PHQ over the last two weeks 6/27/2017   Little interest or pleasure in doing things Several days   Feeling down, depressed or hopeless Several days   Total Score PHQ 2 2       Fall Risk Assessment:    Fall Risk Assessment, last 12 mths 6/27/2017   Able to walk? Yes   Fall in past 12 months? Yes   Fall with injury? No   Number of falls in past 12 months 5   Fall Risk Score 5       Functional Ability:   Does the patient exhibit a steady gait?  no   How long did it take the patient to get up and walk from a sitting position? 5sec   Is the patient self reliant?  (ie can do own laundry, meals, household chores)  yes     Does the patient handle his/her own medications? yes     Does the patient handle his/her own money? yes     Is the patients home safe (ie good lighting, handrails on stairs and bath, etc.)? yes     Did you notice or did patient express any hearing difficulties? no     Did you notice or did patient express any vision difficulties?   no     Were distance and reading eye charts used? no       Advance Care Planning:   Patient was offered the opportunity to discuss advance care planning:  yes     Does patient have an Advance Directive:  yes   If no, did you provide information on Caring Connections? yes       Plan: Patient will bring in copy of her ACP. She has fallen several times secondary to her knee issues. She does have a follow-up with the orthopedist.  She states they are planning to order physical therapy. No orders of the defined types were placed in this encounter. Health Maintenance   Topic Date Due    FOBT Q 1 YEAR AGE 50-75  03/08/2017 had colonoscopy 2017    MEDICARE YEARLY EXAM  03/09/2017 today    INFLUENZA AGE 9 TO ADULT  08/01/2017     BREAST CANCER SCRN MAMMOGRAM  09/06/2018    GLAUCOMA SCREENING Q2Y  02/01/2019    DTaP/Tdap/Td series (2 - Td) 02/18/2025    Hepatitis C Screening  Completed    OSTEOPOROSIS SCREENING (DEXA)  Addressed    ZOSTER VACCINE AGE 60>  Addressed    Pneumococcal 65+ High/Highest Risk  Completed       *Patient verbalized understanding and agreement with the plan. A copy of the After Visit Summary with personalized health plan was given to the patient today.

## 2017-06-27 NOTE — PATIENT INSTRUCTIONS
Schedule of Personalized Health Plan  (Provide Copy to Patient)  The best way to stay healthy is to live a healthy lifestyle. A healthy lifestyle includes regular exercise, eating a well-balanced diet, keeping a healthy weight and not smoking. Regular physical exams and screening tests are another important way to take care of yourself. Preventive exams provided by health care providers can find health problems early when treatment works best and can keep you from getting certain diseases or illnesses. Preventive services include exams, lab tests, screenings, shots, monitoring and information to help you take care of your own health. All people over 65 should have a pneumonia shot. Pneumonia shots are usually only needed once in a lifetime unless your doctor decides differently. All people over 65 should have a yearly flu shot. People over 65 are at medium to high risk for Hepatitis B. Three shots are needed for complete protection. In addition to your physical exam, some screening tests are recommended:    Bone mass measurement (dexa scan) is recommended every two years  Diabetes Mellitus screening is recommended every year. Glaucoma is an eye disease caused by high pressure in the eye. An eye exam is recommended every year. Cardiovascular screening tests that check your cholesterol and other blood fat (lipid) levels are recommended every five years. Colorectal Cancer screening tests help to find pre-cancerous polyps (growths in the colon) so they can be removed before they turn into cancer. Tests ordered for screening depend on your personal and family history risk factors.     Screening for Breast Cancer is recommended yearly with a mammogram.    Screening for Cervical Cancer is recommended every two years (annually for certain risk factors, such as previous history of STD or abnormal PAP in past 7 years), with a Pelvic Exam with PAP    Here is a list of your current Health Maintenance items with a due date:  Health Maintenance   Topic Date Due    FOBT Q 1 YEAR AGE 50-75  03/08/2017    MEDICARE YEARLY EXAM  03/09/2017    INFLUENZA AGE 9 TO ADULT  08/01/2017    BREAST CANCER SCRN MAMMOGRAM  09/06/2018    GLAUCOMA SCREENING Q2Y  02/01/2019    DTaP/Tdap/Td series (2 - Td) 02/18/2025    Hepatitis C Screening  Completed    OSTEOPOROSIS SCREENING (DEXA)  Addressed    ZOSTER VACCINE AGE 60>  Addressed    Pneumococcal 65+ High/Highest Risk  Completed     Same medications  Gradually cut back on your use of Kerby  Follow-up with orthopedist  Consider physical therapy  Lab work today  Keep follow-up in September  Follow-up if sore on right leg not continuing to heal

## 2017-06-27 NOTE — LETTER
6/30/2017 10:17 AM 
 
Ms. Girma Mckeon [id number] Dear Girma Chowdary: 
 
Please find your most recent results below. Resulted Orders CBC WITH AUTOMATED DIFF Result Value Ref Range WBC 6.6 3.4 - 10.8 x10E3/uL  
 RBC 4.12 3.77 - 5.28 x10E6/uL HGB 13.1 11.1 - 15.9 g/dL HCT 38.7 34.0 - 46.6 % MCV 94 79 - 97 fL  
 MCH 31.8 26.6 - 33.0 pg  
 MCHC 33.9 31.5 - 35.7 g/dL  
 RDW 13.9 12.3 - 15.4 % PLATELET 355 456 - 727 x10E3/uL NEUTROPHILS 61 % Lymphocytes 27 % MONOCYTES 8 % EOSINOPHILS 2 % BASOPHILS 1 %  
 ABS. NEUTROPHILS 4.1 1.4 - 7.0 x10E3/uL Abs Lymphocytes 1.8 0.7 - 3.1 x10E3/uL  
 ABS. MONOCYTES 0.5 0.1 - 0.9 x10E3/uL  
 ABS. EOSINOPHILS 0.1 0.0 - 0.4 x10E3/uL  
 ABS. BASOPHILS 0.0 0.0 - 0.2 x10E3/uL IMMATURE GRANULOCYTES 1 %  
 ABS. IMM. GRANS. 0.0 0.0 - 0.1 x10E3/uL Narrative Performed at:  [address]  [id number] : Cyrus Dhaliwal MD, Phone:  [phone] METABOLIC PANEL, COMPREHENSIVE Result Value Ref Range Glucose 100 (H) 65 - 99 mg/dL BUN 18 8 - 27 mg/dL Creatinine 0.85 0.57 - 1.00 mg/dL GFR est non-AA 69 >59 mL/min/1.73 GFR est AA 79 >59 mL/min/1.73  
 BUN/Creatinine ratio 21 12 - 28 Sodium 145 (H) 134 - 144 mmol/L Potassium 4.3 3.5 - 5.2 mmol/L Chloride 101 96 - 106 mmol/L  
 CO2 24 18 - 29 mmol/L Calcium 11.1 (H) 8.7 - 10.3 mg/dL Protein, total 7.0 6.0 - 8.5 g/dL Albumin 4.2 3.5 - 4.8 g/dL GLOBULIN, TOTAL 2.8 1.5 - 4.5 g/dL A-G Ratio 1.5 1.2 - 2.2 Bilirubin, total 0.5 0.0 - 1.2 mg/dL Alk. phosphatase 73 39 - 117 IU/L  
 AST (SGOT) 30 0 - 40 IU/L  
 ALT (SGPT) 19 0 - 32 IU/L Narrative Performed at:  [address]  [id number] : Cyrus Dhaliwal MD, Phone:  [phone] LIPID PANEL Result Value Ref Range Cholesterol, total 157 100 - 199 mg/dL Triglyceride 48 0 - 149 mg/dL HDL Cholesterol 72 >39 mg/dL VLDL, calculated 10 5 - 40 mg/dL LDL, calculated 75 0 - 99 mg/dL Narrative Performed at:  [address]  [id number] : Serena Ortiz MD, Phone:  [phone] TSH 3RD GENERATION Result Value Ref Range TSH 2.540 0.450 - 4.500 uIU/mL Narrative Performed at:  [address]  [id number] : Serena Ortiz MD, Phone:  [phone] URINALYSIS W/ RFLX MICROSCOPIC Result Value Ref Range Specific Gravity 1.029 1.005 - 1.030  
 pH (UA) 6.0 5.0 - 7.5 Color Yellow Yellow Appearance Turbid (A) Clear Leukocyte Esterase 1+ (A) Negative Protein Trace Negative/Trace Glucose Negative Negative Ketone Trace (A) Negative Blood Negative Negative Bilirubin Negative Negative Urobilinogen 1.0 0.2 - 1.0 mg/dL Nitrites Negative Negative Microscopic Examination See additional order Comment:  
   Microscopic was indicated and was performed. Narrative Performed at:  [address]  [id number] : Serena Ortiz MD, Phone:  [phone] VITAMIN D, 25 HYDROXY Result Value Ref Range VITAMIN D, 25-HYDROXY 27.3 (L) 30.0 - 100.0 ng/mL Comment:  
   Vitamin D deficiency has been defined by the [facility] practice guideline as a 
level of serum 25-OH vitamin D less than 20 ng/mL (1,2). The Endocrine Society went on to further define vitamin D 
insufficiency as a level between 21 and 29 ng/mL (2). 1. IOM ([place] [employer] [employer]). 2010. Dietary reference 
   intakes for calcium and D. [facility]: The 
   "[employer].  
2. Sandy MARSHALL, Abhay HEREDIA, Clau MACKENZIE, et al. 
   Evaluation, treatment, and prevention of vitamin D 
 deficiency: an Endocrine Society clinical practice 
   guideline. JCEM. 2011 Jul; 96(7):1911-30. Narrative Performed at:  [address]  [id number] : Juan Ortiz MD, Phone:  [phone] HEMOGLOBIN A1C WITH EAG Result Value Ref Range Hemoglobin A1c 5.6 4.8 - 5.6 % Comment:  
            Pre-diabetes: 5.7 - 6.4 Diabetes: >6.4 Glycemic control for adults with diabetes: <7.0 Estimated average glucose 114 mg/dL Narrative Performed at:  [address]  [id number] : Juan Ortiz MD, Phone:  [phone] MICROSCOPIC EXAMINATION Result Value Ref Range WBC 6-10 (A) 0 - 5 /hpf  
 RBC None seen 0 - 2 /hpf Epithelial cells 0-10 0 - 10 /hpf Casts None seen None seen /lpf Crystals Present (A) N/A Crystal type Calcium Oxalate N/A Mucus Present Not Estab. Bacteria Few None seen/Few Narrative Performed at:  [address]  [id number] : Juan Ortiz MD, Phone:  [phone] CVD REPORT Result Value Ref Range INTERPRETATION Note Comment:  
   Supplement report is available. Narrative Performed at:  [address] [place] [address]  [id number] : Christopher Hu PhD, Phone:  [phone] ALL BLOOD WORK IS NORMAL BUT CALICIUM IS JUST ALITTLE HIGH NEED TO STAY WITH CURRENT MEDICATIONS AND KEEP PLANNED FOLLOW UP Please call me if you have any questions: [phone] Sincerely, Vincent Anne MD

## 2017-06-27 NOTE — PROGRESS NOTES
Maikel Urrutia is a 67 y.o. female who presents to the office today with the following:  Chief Complaint   Patient presents with   [facility] Annual Wellness Visit            Allergies   Allergen Reactions    Levaquin [Levofloxacin] Other (comments)     Muscle weakness    Percocet [Oxycodone-Acetaminophen] Other (comments)     halucinations      Statins-Hmg-Coa Reductase Inhibitors Other (comments)     Muscle weakness       Current Outpatient Prescriptions   Medication Sig    HYDROcodone-acetaminophen (NORCO) 5-325 mg per tablet One tab twice daily  Only if needed    eszopiclone (LUNESTA) 2 mg tablet Take 1 Tab by mouth nightly as needed for Sleep. Max Daily Amount: 2 mg.  venlafaxine-SR (EFFEXOR XR) 150 mg capsule Take 1 Cap by mouth two (2) times a day.  methylphenidate 20 mg SC40 Take 20 mg by mouth two (2) times a dayIndications: ATTENTION-DEFICIT HYPERACTIVITY DISORDER. Max Daily Amount: 40 mg    ESTRING 2 mg (7.5 mcg /24 hour) vaginal ring Insert 2 mg into vagina.  cycloSPORINE (RESTASIS) 0.05 % ophthalmic emulsion RESTASIS 0.05 % EMUL    ammonium lactate (LAC-HYDRIN) 12 % lotion rub in to affected area well twice daily    calcium carbonate-vitamin D3 600 mg(1,500mg) -800 unit tab Take 600 mg by mouth daily.  cholecalciferol (VITAMIN D3) 1,000 unit tablet Take 2 Tabs by mouth daily.  lisinopril (PRINIVIL, ZESTRIL) 20 mg tablet Take 1 Tab by mouth daily.  potassium chloride (K-DUR, KLOR-CON) 20 mEq tablet Take 1 Tab by mouth daily.  spironolactone (ALDACTONE) 50 mg tablet Take 1 Tab by mouth daily.  torsemide (DEMADEX) 20 mg tablet Take 1 tab for leg swelling PRN  Do not take more than 1 tab per week  Indications: Edema    tretinoin (RETIN-A) 0.1 % topical cream APPLY TO AFFECTED AREA EVERY NIGHT    rOPINIRole (REQUIP) 1 mg tablet Take 0.5 Tabs by mouth three (3) times daily. (Patient taking differently: Take 0.5 mg by mouth three (3) times daily.  Indications: Restless Legs Syndrome, pt takes 1mg QHS)    fenofibrate nanocrystallized (TRICOR) 145 mg tablet TAKE ONE TABLET BY MOUTH DAILY     No current facility-administered medications for this visit. Past Medical History:   Diagnosis Date    Actinic keratosis     ADHD (attention deficit hyperactivity disorder)     Arthritis     Bipolar affective disorder ([facility] [address]     Dr. Ashish Mendes Colon cancer [facility])     polyps on scope 2/17    Depression     GERD (gastroesophageal reflux disease)     Insomnia     Osteoporosis     Dexa 9/6/16       Past Surgical History:   Procedure Laterality Date    HX BACK SURGERY      HX COLECTOMY  1982    HX GI      HX HEENT      HX MALIGNANT SKIN LESION EXCISION         History   Smoking Status    Former Smoker    Packs/day: 1.00    Quit date: 3/5/1970   Smokeless Tobacco    Never Used       Family History   Problem Relation Age of Onset   [place] Cancer Father      melanoma, larynx    Cancer Mother     Hypertension Mother     Cancer Maternal Aunt      pancreatic    Cancer Maternal Grandmother      pancreatic         History of Present Illness:  Patient here for ongoing medical follow-up    Patient with long and complex medical history patient with a history of chronic lower extremity edema and venous stasis. She has been evaluated by surgery in the past for this. She has had workup in the past including normal MARISSA exam.  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 She is currently on Spironolactone daily and as needed fursemide. She only needs fursemide once or twice a week. She has not been wearing support hose because they are difficult for her to put on . Tomas Liz She does complain of increasing swelling and irritation of her legs as the day goes on. History of venous ulcers. She did scrape  her right shin last week. It did bleed. She does have a scab on that area. No ulcerations. No surrounding redness or signs of infection.   She is up-to-date with her tetanus booster. . Winter 2017 we did do a vascular ultrasound which did show some venous reflux. No evidence of DVTs. We did have her seen by vascular surgery. I do not have the consult back but patient states they told her her circulation was fine. He did not recommend any additional treatment at this point. She does not have a follow-up with them. She plans to continue with her diuretics as noted above. We stressed the importance of leg elevation    She has hypertension. On lisinopril. No current cardiac complaints. Most recent basic metabolic profile normal.  She has no cardiac complaints. Blood pressure is in good control on her current regimen. Lab work ordered at the last visit but not done. It was redrawn today. She has a history of hyperlipidemia. Intolerant to statins. Currently on TriCor. Most recent lipid panel acceptable with normal LFTs. We are continuing her medication and getting lab work today    Patient does have anxiety/bipolar disease associated with insomnia and ADHD. She does followed with psychiatry. They currently have her on Effexor, Ritalin as needed, Lunesta and Vistaril. .  Overall she has been stable. She has ongoing follow-up with her psychiatrist.  They are trying to switch her to sustained release Ritalin    She has remote history of skin cancer. She has had multiple skin lesions removed over the years. The dermatologist did start her on Retin-A which has helped. Her primary care providers had been writing for this. She does plan to discuss this with her dermatologist as well. She also has some venous stasis dermatitis lower extremities. She uses Lac-Hydrin for that. Patient states she was seen by dermatology spring 2017. Had several biopsies which were benign    She has a history of a malignant polyp removed from her colon in 1982. She needs periodic surveillance colonoscopies. Patient did undergo repeat colonoscopy February 2017.   Several polyps were removed. These were benign. She was told by the surgeon that she should have a repeat scope in 5 years    She does have a history of osteoporosis. Confirmed on recent DEXA scan 2016. She is on vitamin D replacement. Calcium was recently started. She was not felt to be an acceptable candidate for Fosamax as she has extensive dental work and is going to be undergoing extractions. I am checking vitamin D levels    She does have restless leg syndrome treated with Requip. She states this works quite well. She has chronic back pain secondary to lumbar spondylosis. She has had surgery on her back in the past for disc disease. She does have ongoing pain in her back. She has been intolerant to multiple pain medications. She is currently on Norco.  We have gradually been decreasing her dose. Her last prescription was for 45 tablets the end of May. She is aware that future prescriptions will be written for 30 tablets. .  She has been seen by pain management in the past.  Injections were not helpful. She would prefer not to drive to [place] for pain management. .  Patient did sign a pain contract previously. She is aware of risk of sedation and driving restrictions with narcotic pain medication. She has not taken her medications since last Friday so no urine drug screen was done today      She does have osteoarthritis in her knees. She is currently following with orthopedist.  They did recently give her a cortisone shot. She still has ongoing issues with knee pain. It does give way occasionally. She has fallen. She states she has an appointment coming up with the orthopedist.  They are going to be discussing physical therapy. I also recommended PT for her back and core. She is aware but wants to wait till her orthopedist sets this up    Remote history of GERD no current complaints    She has a history of cataracts.   She tells me they are contemplating cataract surgery    History of elevated BG without diabetes. Repeat labs/A1c ordered    Patient did see gynecology spring 2017. They did give her Estrace ring. She was having some menopausal symptoms. She on her own decided to remove the ring because she was worried about the estrogen therapy and the risks therein. She is going to call and talk with them about that. Recent mammogram September 2016 normal    She has had a tetanus booster in 2005. She has had 2 pneumonia shots. She has had the flu shot this year. She recently got the Zostavax    Patient already scheduled to see me back in September for ongoing medical follow-up    Review of Systems:      Review of systems negative except as noted above    Physical Exam:  Visit Vitals    /75 (BP 1 Location: Left arm, BP Patient Position: Sitting)    Pulse 96    Temp 98.5 °F (36.9 °C) (Oral)    Resp 16    Wt 179 lb 3.2 oz (81.3 kg)    SpO2 97%    BMI 28.92 kg/m2     Vitals:    06/27/17 1104   BP: 116/75   BP 1 Location: Left arm   BP Patient Position: Sitting   Pulse: 96   Resp: 16   Temp: 98.5 °F (36.9 °C)   TempSrc: Oral   SpO2: 97%   Weight: 179 lb 3.2 oz (81.3 kg)    Patient no acute distress vital signs stable as above. Head is normocephalic  Patient dressed appropriately. Good eye contact. Did not appear anxious or distressed. No psychotic or suicidal ideations  External ears normal.  Ear canals normal.\  Eyes PERRLA. EOMs full. Sclera clear. Patient seeing her eye doctor regularly    Neck no nodes no masses no bruits. No goiter  Chest was clear no wheezes rhonchi or rales. Good air exchange  Cor regular rate and rhythm no S3-S4 no murmurs  Abdomen obese no HSM no masses soft and was nontender  Low back revealed midline scar from previous surgery. Tenderness to palpation paraspinous muscles. Negative sitting straight leg raising sign  Extremities had trace pretibial edema. Some dependent rubor noted. Good pedal pulses and capillary refill. Dry skin. Small superficial scab right lateral shin. No drainage. No surrounding erythema. No signs of infection        1. Medicare annual wellness visit, subsequent  See Medicare wellness note    2. Essential hypertension  Controlled on current regimen. EKG was ordered but patient left office before EKG was done. Order canceled. It was done for baseline. I will do this at her upcoming visit  - AMB POC EKG ROUTINE W/ 12 LEADS, INTER & REP  - CBC WITH AUTOMATED DIFF  - METABOLIC PANEL, COMPREHENSIVE  - URINALYSIS W/ RFLX MICROSCOPIC    3. Hyperlipidemia, unspecified hyperlipidemia type  Checking labs  - LIPID PANEL  - TSH 3RD GENERATION    4. Elevated blood sugar    - HEMOGLOBIN A1C WITH EAG    5. Osteoarthritis of knee, unspecified laterality, unspecified osteoarthritis type  Patient on hydrocodone. Prescription was changed today but medication was not prescribed. Patient's last request for refill was 5/30/17  - HYDROcodone-acetaminophen (NORCO) 5-325 mg per tablet; One tab twice daily  Only if needed  Dispense: 30 Tab; Refill: 0    6. Osteoporosis without current pathological fracture, unspecified osteoporosis type    - VITAMIN D, 25 HYDROXY    7. Postmenopausal atrophic vaginitis  Followed by GYN    8. Degenerative spondylolisthesis      Patient Instructions     Schedule of Personalized Health Plan  (Provide Copy to Patient)  The best way to stay healthy is to live a healthy lifestyle. A healthy lifestyle includes regular exercise, eating a well-balanced diet, keeping a healthy weight and not smoking. Regular physical exams and screening tests are another important way to take care of yourself. Preventive exams provided by health care providers can find health problems early when treatment works best and can keep you from getting certain diseases or illnesses. Preventive services include exams, lab tests, screenings, shots, monitoring and information to help you take care of your own health.     All people over 65 should have a pneumonia shot. Pneumonia shots are usually only needed once in a lifetime unless your doctor decides differently. All people over 65 should have a yearly flu shot. People over 65 are at medium to high risk for Hepatitis B. Three shots are needed for complete protection. In addition to your physical exam, some screening tests are recommended:    Bone mass measurement (dexa scan) is recommended every two years  Diabetes Mellitus screening is recommended every year. Glaucoma is an eye disease caused by high pressure in the eye. An eye exam is recommended every year. Cardiovascular screening tests that check your cholesterol and other blood fat (lipid) levels are recommended every five years. Colorectal Cancer screening tests help to find pre-cancerous polyps (growths in the colon) so they can be removed before they turn into cancer. Tests ordered for screening depend on your personal and family history risk factors.     Screening for Breast Cancer is recommended yearly with a mammogram.    Screening for Cervical Cancer is recommended every two years (annually for certain risk factors, such as previous history of STD or abnormal PAP in past 7 years), with a Pelvic Exam with PAP    Here is a list of your current Health Maintenance items with a due date:  Health Maintenance   Topic Date Due    FOBT Q 1 YEAR AGE 50-75  03/08/2017    MEDICARE YEARLY EXAM  03/09/2017    INFLUENZA AGE 9 TO ADULT  08/01/2017    BREAST CANCER SCRN MAMMOGRAM  09/06/2018    GLAUCOMA SCREENING Q2Y  02/01/2019    DTaP/Tdap/Td series (2 - Td) 02/18/2025    Hepatitis C Screening  Completed    OSTEOPOROSIS SCREENING (DEXA)  Addressed    ZOSTER VACCINE AGE 60>  Addressed    Pneumococcal 65+ High/Highest Risk  Completed     Same medications  Gradually cut back on your use of [place]  Follow-up with orthopedist  Consider physical therapy  Lab work today  Keep follow-up in September  Follow-up if sore on right leg not continuing to heal          Continue current therapy plan except for indicated above. Verbal and written instructions (see AVS) provided.  Patient expresses understanding of diagnosis and treatment plan. Follow-up Disposition: Not on [address] [place] [address].  Kaylah Márquez MD

## 2017-06-28 PROBLEM — E83.52 HYPERCALCEMIA: Status: ACTIVE | Noted: 2017-06-28

## 2017-06-28 LAB
25(OH)D3+25(OH)D2 SERPL-MCNC: 27.3 NG/ML (ref 30–100)
ALBUMIN SERPL-MCNC: 4.2 G/DL (ref 3.5–4.8)
ALBUMIN/GLOB SERPL: 1.5 {RATIO} (ref 1.2–2.2)
ALP SERPL-CCNC: 73 IU/L (ref 39–117)
ALT SERPL-CCNC: 19 IU/L (ref 0–32)
APPEARANCE UR: ABNORMAL
AST SERPL-CCNC: 30 IU/L (ref 0–40)
BACTERIA #/AREA URNS HPF: ABNORMAL /[HPF]
BASOPHILS # BLD AUTO: 0 X10E3/UL (ref 0–0.2)
BASOPHILS NFR BLD AUTO: 1 %
BILIRUB SERPL-MCNC: 0.5 MG/DL (ref 0–1.2)
BILIRUB UR QL STRIP: NEGATIVE
BUN SERPL-MCNC: 18 MG/DL (ref 8–27)
BUN/CREAT SERPL: 21 (ref 12–28)
CALCIUM SERPL-MCNC: 11.1 MG/DL (ref 8.7–10.3)
CASTS URNS QL MICRO: ABNORMAL /LPF
CHLORIDE SERPL-SCNC: 101 MMOL/L (ref 96–106)
CHOLEST SERPL-MCNC: 157 MG/DL (ref 100–199)
CO2 SERPL-SCNC: 24 MMOL/L (ref 18–29)
COLOR UR: YELLOW
CREAT SERPL-MCNC: 0.85 MG/DL (ref 0.57–1)
CRYSTALS URNS MICRO: ABNORMAL
EOSINOPHIL # BLD AUTO: 0.1 X10E3/UL (ref 0–0.4)
EOSINOPHIL NFR BLD AUTO: 2 %
EPI CELLS #/AREA URNS HPF: ABNORMAL /HPF
ERYTHROCYTE [DISTWIDTH] IN BLOOD BY AUTOMATED COUNT: 13.9 % (ref 12.3–15.4)
EST. AVERAGE GLUCOSE BLD GHB EST-MCNC: 114 MG/DL
GLOBULIN SER CALC-MCNC: 2.8 G/DL (ref 1.5–4.5)
GLUCOSE SERPL-MCNC: 100 MG/DL (ref 65–99)
GLUCOSE UR QL: NEGATIVE
HBA1C MFR BLD: 5.6 % (ref 4.8–5.6)
HCT VFR BLD AUTO: 38.7 % (ref 34–46.6)
HDLC SERPL-MCNC: 72 MG/DL
HGB BLD-MCNC: 13.1 G/DL (ref 11.1–15.9)
HGB UR QL STRIP: NEGATIVE
IMM GRANULOCYTES # BLD: 0 X10E3/UL (ref 0–0.1)
IMM GRANULOCYTES NFR BLD: 1 %
INTERPRETATION, 910389: NORMAL
KETONES UR QL STRIP: ABNORMAL
LDLC SERPL CALC-MCNC: 75 MG/DL (ref 0–99)
LEUKOCYTE ESTERASE UR QL STRIP: ABNORMAL
LYMPHOCYTES # BLD AUTO: 1.8 X10E3/UL (ref 0.7–3.1)
LYMPHOCYTES NFR BLD AUTO: 27 %
MCH RBC QN AUTO: 31.8 PG (ref 26.6–33)
MCHC RBC AUTO-ENTMCNC: 33.9 G/DL (ref 31.5–35.7)
MCV RBC AUTO: 94 FL (ref 79–97)
MICRO URNS: ABNORMAL
MONOCYTES # BLD AUTO: 0.5 X10E3/UL (ref 0.1–0.9)
MONOCYTES NFR BLD AUTO: 8 %
MUCOUS THREADS URNS QL MICRO: PRESENT
NEUTROPHILS # BLD AUTO: 4.1 X10E3/UL (ref 1.4–7)
NEUTROPHILS NFR BLD AUTO: 61 %
NITRITE UR QL STRIP: NEGATIVE
PH UR STRIP: 6 [PH] (ref 5–7.5)
PLATELET # BLD AUTO: 180 X10E3/UL (ref 150–379)
POTASSIUM SERPL-SCNC: 4.3 MMOL/L (ref 3.5–5.2)
PROT SERPL-MCNC: 7 G/DL (ref 6–8.5)
PROT UR QL STRIP: ABNORMAL
RBC # BLD AUTO: 4.12 X10E6/UL (ref 3.77–5.28)
RBC #/AREA URNS HPF: ABNORMAL /HPF
SODIUM SERPL-SCNC: 145 MMOL/L (ref 134–144)
SP GR UR: 1.03 (ref 1–1.03)
TRIGL SERPL-MCNC: 48 MG/DL (ref 0–149)
TSH SERPL DL<=0.005 MIU/L-ACNC: 2.54 UIU/ML (ref 0.45–4.5)
UNIDENT CRYS URNS QL MICRO: PRESENT
UROBILINOGEN UR STRIP-MCNC: 1 MG/DL (ref 0.2–1)
VLDLC SERPL CALC-MCNC: 10 MG/DL (ref 5–40)
WBC # BLD AUTO: 6.6 X10E3/UL (ref 3.4–10.8)
WBC #/AREA URNS HPF: ABNORMAL /HPF

## 2017-06-28 NOTE — PROGRESS NOTES
Labs reviewed please notify patient  CBC normal  Lipids excellent  Vitamin D levels improved  Sugars normal    Only significant abnormality is her calcium is a bit high.   Possibly due to diuretic therapy  She should continue her current medications at present  In 1 month I want to repeat lab work calcium and PTH  Keep planned follow-up

## 2017-06-29 ENCOUNTER — TELEPHONE (OUTPATIENT)
Dept: FAMILY MEDICINE CLINIC | Age: 72
End: 2017-06-29

## 2017-06-29 RX ORDER — CEPHALEXIN 500 MG/1
500 CAPSULE ORAL 2 TIMES DAILY
Qty: 20 CAP | Refills: 0 | Status: SHIPPED | OUTPATIENT
Start: 2017-06-29 | End: 2017-07-09

## 2017-06-29 RX ORDER — FLUCONAZOLE 150 MG/1
150 TABLET ORAL DAILY
Qty: 1 TAB | Refills: 0 | Status: SHIPPED | OUTPATIENT
Start: 2017-06-29 | End: 2017-06-30

## 2017-06-29 NOTE — TELEPHONE ENCOUNTER
Patient with chronic lower extremity edema. When I saw her earlier this week there was a small scab on her right shin where she had bumped into something several days prior. There was no signs of infection. She called today stating the scabs come off. It sore and she noted a bit of exudate when she pushed on it. She was requesting an antibiotic. She states there was no way she could come into the office today. I did call in a prescription for Keflex for her. I stressed the importance of keeping her feet elevated. I stressed the importance that she should take her Lasix for the edema. She should follow-up if she is getting any worse or not better over the next several days. She also requested a prescription for Diflucan as she tends to get yeast infections with antibiotics.   Both were called to her pharmacy

## 2017-07-06 ENCOUNTER — TELEPHONE (OUTPATIENT)
Dept: FAMILY MEDICINE CLINIC | Age: 72
End: 2017-07-06

## 2017-07-07 ENCOUNTER — TELEPHONE (OUTPATIENT)
Dept: FAMILY MEDICINE CLINIC | Age: 72
End: 2017-07-07

## 2017-07-07 DIAGNOSIS — M17.9 OSTEOARTHRITIS OF KNEE, UNSPECIFIED LATERALITY, UNSPECIFIED OSTEOARTHRITIS TYPE: ICD-10-CM

## 2017-07-07 RX ORDER — HYDROCODONE BITARTRATE AND ACETAMINOPHEN 5; 325 MG/1; MG/1
TABLET ORAL
Qty: 30 TAB | Refills: 0 | Status: SHIPPED | OUTPATIENT
Start: 2017-07-07 | End: 2017-08-30

## 2017-07-11 ENCOUNTER — TELEPHONE (OUTPATIENT)
Dept: FAMILY MEDICINE CLINIC | Age: 72
End: 2017-07-11

## 2017-07-11 NOTE — TELEPHONE ENCOUNTER
Patient came in and picked up an Rx.from  for yeast infection. She said she called her pharmacy and if was not there. See scanned Rx.

## 2017-08-30 ENCOUNTER — OFFICE VISIT (OUTPATIENT)
Dept: FAMILY MEDICINE CLINIC | Age: 72
End: 2017-08-30

## 2017-08-30 VITALS
RESPIRATION RATE: 18 BRPM | BODY MASS INDEX: 29.57 KG/M2 | WEIGHT: 183.2 LBS | HEART RATE: 96 BPM | SYSTOLIC BLOOD PRESSURE: 110 MMHG | TEMPERATURE: 96.9 F | OXYGEN SATURATION: 96 % | DIASTOLIC BLOOD PRESSURE: 71 MMHG

## 2017-08-30 DIAGNOSIS — I87.2 VENOUS INSUFFICIENCY: Primary | ICD-10-CM

## 2017-08-30 DIAGNOSIS — I10 ESSENTIAL HYPERTENSION: ICD-10-CM

## 2017-08-30 DIAGNOSIS — Z12.39 SCREENING FOR BREAST CANCER: ICD-10-CM

## 2017-08-30 DIAGNOSIS — E78.5 HYPERLIPIDEMIA, UNSPECIFIED HYPERLIPIDEMIA TYPE: ICD-10-CM

## 2017-08-30 DIAGNOSIS — R60.0 LOCALIZED EDEMA: ICD-10-CM

## 2017-08-30 DIAGNOSIS — M79.89 RIGHT LEG SWELLING: ICD-10-CM

## 2017-08-30 DIAGNOSIS — E83.52 HYPERCALCEMIA: ICD-10-CM

## 2017-08-30 RX ORDER — GABAPENTIN 100 MG/1
300 CAPSULE ORAL 2 TIMES DAILY
COMMUNITY
End: 2019-06-21

## 2017-08-30 RX ORDER — METHYLPHENIDATE HYDROCHLORIDE EXTENDED RELEASE 20 MG/1
20 TABLET ORAL
COMMUNITY
End: 2017-08-30 | Stop reason: SDUPTHER

## 2017-08-30 RX ORDER — TORSEMIDE 20 MG/1
TABLET ORAL
Qty: 10 TAB | Refills: 1 | Status: SHIPPED | OUTPATIENT
Start: 2017-08-30 | End: 2018-01-16 | Stop reason: SDUPTHER

## 2017-08-30 NOTE — PROGRESS NOTES
Chief Complaint   Patient presents with    Leg Swelling     both leg pain & swelling; pt says circulatory problems hereditary; both feet sensitive     Visit Vitals    /71 (BP 1 Location: Right arm, BP Patient Position: Sitting)    Pulse 96    Temp 96.9 °F (36.1 °C) (Oral)    Resp 18    Wt 183 lb 3.2 oz (83.1 kg)    SpO2 96%    BMI 29.57 kg/m2     Opal Woods LPN

## 2017-08-30 NOTE — MR AVS SNAPSHOT
Visit Information Date & Time Provider Department Dept. Phone Encounter #  
 8/30/2017 10:40 AM Yaima Jennings  [facility] [phone] [id number] Your Appointments 6/28/2018 10:30 AM  
Medicare Physical with Yaima Jennings MD  
[address] [place] [address]) Appt Note: Due for MARII Dixon [address] [phone]  
[phone]  
  
   
 [address] [address] Upcoming Health Maintenance Date Due FOBT Q 1 YEAR AGE 50-75 3/8/2017 INFLUENZA AGE 9 TO ADULT 8/1/2017 MEDICARE YEARLY EXAM 6/28/2018 BREAST CANCER SCRN MAMMOGRAM 9/6/2018 GLAUCOMA SCREENING Q2Y 2/1/2019 DTaP/Tdap/Td series (2 - Td) 2/18/2025 Allergies as of 8/30/2017  Review Complete On: 8/30/2017 By: Marian Childs LPN Severity Noted Reaction Type Reactions Levaquin [Levofloxacin]  06/10/2016    Other (comments) Muscle weakness Percocet [Oxycodone-acetaminophen]  05/09/2016    Other (comments)  
 hallucinate 
halucinations Statins-hmg-coa Reductase Inhibitors  06/10/2016    Other (comments) Muscle weakness Current Immunizations  Reviewed on 3/5/2014 Name Date Influenza Vaccine 9/29/2015, 10/10/2013 Influenza Vaccine (Quad) 10/8/2014  4:53 PM  
 Influenza Vaccine (Quad) PF 10/27/2015 Pneumococcal Conjugate (PCV-13) 3/8/2016 Pneumococcal Polysaccharide (PPSV-23) 10/1/2014 Td 10/10/2013 Tdap 2/18/2015 Not reviewed this visit Vitals BP Pulse Temp Resp Weight(growth percentile) SpO2  
 110/71 (BP 1 Location: Right arm, BP Patient Position: Sitting) 96 96.9 °F (36.1 °C) (Oral) 18 183 lb 3.2 oz (83.1 kg) 96% BMI OB Status Smoking Status 29.57 kg/m2 Postmenopausal Former Smoker BMI and BSA Data Body Mass Index Body Surface Area  
 29.57 kg/m 2 1.97 m 2 Preferred Pharmacy Pharmacy Name Phone [facility],[address], [address] [place] [address]  [phone] Your Updated Medication List  
  
   
This list is accurate as of: 8/30/17 11:37 AM.  Always use your most recent med list.  
  
  
  
  
 ammonium lactate 12 % lotion Commonly known as:  LAC-HYDRIN  
rub in to affected area well twice daily  
  
 calcium carbonate-vitamin D3 600 mg(1,500mg) -800 unit Tab Take 600 mg by mouth daily. cholecalciferol 1,000 unit tablet Commonly known as:  VITAMIN D3 Take 2 Tabs by mouth daily. ESTRING 2 mg (7.5 mcg /24 hour) vaginal ring Generic drug:  estradiol Insert 2 mg into vagina.  
  
 eszopiclone 2 mg tablet Commonly known as:  [place] Noun Take 1 Tab by mouth nightly as needed for Sleep. Max Daily Amount: 2 mg. fenofibrate nanocrystallized 145 mg tablet Commonly known as:  TRICOR  
TAKE ONE TABLET BY MOUTH DAILY  
  
 gabapentin 100 mg capsule Commonly known as:  NEURONTIN Take  by mouth three (3) times daily. HYDROcodone-acetaminophen 5-325 mg per tablet Commonly known as:  Kristen Marianne One tab twice daily  Only if needed  
  
 lisinopril 20 mg tablet Commonly known as:  [place] Take 1 Tab by mouth daily. methylphenidate HCl 20 mg Cb24 Take 20 mg by mouth two (2) times a day. Max Daily Amount: 40 mg  
  
 nortriptyline 25 mg capsule Commonly known as:  PAMELOR Take 1 Cap by mouth nightly. potassium chloride 20 mEq tablet Commonly known as:  K-DUR, KLOR-CON Take 1 Tab by mouth daily. RESTASIS 0.05 % ophthalmic emulsion Generic drug:  cycloSPORINE  
RESTASIS 0.05 % EMUL  
  
 rOPINIRole 1 mg tablet Commonly known as:  [place] Take 0.5 Tabs by mouth three (3) times daily. spironolactone 50 mg tablet Commonly known as:  ALDACTONE Take 1 Tab by mouth daily. torsemide 20 mg tablet Commonly known as:  DEMADEX Take 1 tab for leg swelling PRN Do not take more than 1 tab per week  Indications: Edema tretinoin 0.1 % topical cream  
Commonly known as:  RETIN-A  
APPLY TO AFFECTED AREA EVERY NIGHT  
  
 venlafaxine- mg capsule Commonly known as:  EFFEXOR XR Take 1 Cap by mouth two (2) times a day. To-Do List   
 09/06/2017 3:00 PM  
  Appointment with Douglas Hancock MD at [address] [place] [address] ([phone])  
  
 09/28/2017 3:30 PM  
  Appointment with Douglas Hancock MD at [address] [place] [address] ([phone]) Introducing [facility] SERVICES! Maris Sky introduces [employer] patient portal. Now you can access parts of your medical record, email your doctor's office, and request medication refills online. 1. In your internet browser, go to [email or web address]. [email or web address] 2. Click on the First Time User? Click Here link in the Sign In box. You will see the New Member Sign Up page. 3. Enter your [employer] Access Code exactly as it appears below. You will not need to use this code after youve completed the sign-up process. If you do not sign up before the expiration date, you must request a new code. · [employer] Access Code: [email or web address] Expires: 9/11/2017  3:26 PM 
 
4. Enter the last four digits of your Social Security Number (xxxx) and Date of Birth (mm/dd/yyyy) as indicated and click Submit. You will be taken to the next sign-up page. 5. Create a [employer] ID. This will be your [employer] login ID and cannot be changed, so think of one that is secure and easy to remember. 6. Create a [employer] password. You can change your password at any time. 7. Enter your Password Reset Question and Answer. This can be used at a later time if you forget your password. 8. Enter your e-mail address. You will receive e-mail notification when new information is available in [address] 9. Click Sign Up. You can now view and download portions of your medical record. 10. Click the Download Summary menu link to download a portable copy of your medical information. If you have questions, please visit the Frequently Asked Questions section of the [employer] website. Remember, [employer] is NOT to be used for urgent needs. For medical emergencies, dial 911. Now available from your iPhone and Android! Please provide this summary of care documentation to your next provider. Your primary care clinician is listed as Apurva Olvera. If you have any questions after today's visit, please call [phone].

## 2017-08-30 NOTE — LETTER
1/8/2018 4:27 PM 
 
Ms. Ying Robert [facility] [place] [address] Dear Ms. Arti Lofton, Upon review of over due results, I see that at your visit on 8/30/17 you where given an order for a screening mammogram.  To date, I do not see the results of that study. Please call our office at [phone] and ask to speak with my nurse to discuss if you have had this study done yet or when you think you may be having it done. Sincerely, Sami Iraheta MD

## 2017-08-30 NOTE — PATIENT INSTRUCTIONS
Same meds  Take torsemide once or twice weekly  Elevate legs  Support hose    In future get pain meds for your back from Dr Justin Mcclure    Lab work today    Follow up [employer]

## 2017-08-30 NOTE — PROGRESS NOTES
Aparna Sampson is a 67 y.o. female who presents to the office today with the following:  Chief Complaint   Patient presents with    Leg Swelling     both leg pain & swelling; pt says circulatory problems hereditary; both feet sensitive       Allergies   Allergen Reactions    Levaquin [Levofloxacin] Other (comments)     Muscle weakness    Percocet [Oxycodone-Acetaminophen] Other (comments)     hallucinate  halucinations      Statins-Hmg-Coa Reductase Inhibitors Other (comments)     Muscle weakness       Current Outpatient Prescriptions   Medication Sig    gabapentin (NEURONTIN) 100 mg capsule Take  by mouth three (3) times daily.  torsemide (DEMADEX) 20 mg tablet Take 1 tab for leg swelling PRN once or twice weekly  Indications: Edema    nortriptyline (PAMELOR) 25 mg capsule Take 1 Cap by mouth nightly.  methylphenidate HCl 20 mg cb24 Take 20 mg by mouth two (2) times a day. Max Daily Amount: 40 mg    HYDROcodone-acetaminophen (NORCO) 5-325 mg per tablet One tab twice daily  Only if needed    eszopiclone (LUNESTA) 2 mg tablet Take 1 Tab by mouth nightly as needed for Sleep. Max Daily Amount: 2 mg.  venlafaxine-SR (EFFEXOR XR) 150 mg capsule Take 1 Cap by mouth two (2) times a day.  ESTRING 2 mg (7.5 mcg /24 hour) vaginal ring Insert 2 mg into vagina.  cycloSPORINE (RESTASIS) 0.05 % ophthalmic emulsion RESTASIS 0.05 % EMUL    ammonium lactate (LAC-HYDRIN) 12 % lotion rub in to affected area well twice daily    cholecalciferol (VITAMIN D3) 1,000 unit tablet Take 2 Tabs by mouth daily.  lisinopril (PRINIVIL, ZESTRIL) 20 mg tablet Take 1 Tab by mouth daily.  potassium chloride (K-DUR, KLOR-CON) 20 mEq tablet Take 1 Tab by mouth daily.  spironolactone (ALDACTONE) 50 mg tablet Take 1 Tab by mouth daily.  tretinoin (RETIN-A) 0.1 % topical cream APPLY TO AFFECTED AREA EVERY NIGHT    rOPINIRole (REQUIP) 1 mg tablet Take 0.5 Tabs by mouth three (3) times daily.  (Patient taking differently: Take 0.5 mg by mouth three (3) times daily. Indications: Restless Legs Syndrome, pt takes 1mg QHS)    fenofibrate nanocrystallized (TRICOR) 145 mg tablet TAKE ONE TABLET BY MOUTH DAILY    calcium carbonate-vitamin D3 600 mg(1,500mg) -800 unit tab Take 600 mg by mouth daily. No current facility-administered medications for this visit. Past Medical History:   Diagnosis Date    Actinic keratosis     ADHD (attention deficit hyperactivity disorder)     Arthritis     Bipolar affective disorder ([facility] [address]     Dr. Luis Antonio Orellana Colon cancer [facility])     polyps on scope 2/17    Depression     GERD (gastroesophageal reflux disease)     Insomnia     Osteoporosis     Dexa 9/6/16       Past Surgical History:   Procedure Laterality Date    HX BACK SURGERY      HX COLECTOMY  1982    HX GI      HX HEENT      HX MALIGNANT SKIN LESION EXCISION         History   Smoking Status    Former Smoker    Packs/day: 1.00    Quit date: 3/5/1970   Smokeless Tobacco    Never Used       Family History   Problem Relation Age of Onset    Cancer Father      melanoma, larynx    Cancer Mother     Hypertension Mother     Cancer Maternal Aunt      pancreatic    Cancer Maternal Grandmother      pancreatic         History of Present Illness:  Patient here for ongoing medical follow-up from her routine visit in June    Patient with long and complex medical history    Patient with a history of chronic lower extremity edema and venous stasis. She has been evaluated by surgery in the past for this. She has had workup in the past including normal MARISSA exam.  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 She is currently on Spironolactone daily and as needed torsemide. She has not been wearing support hose because they are difficult for her to put on . Latisha Mari She does complain of increasing swelling and irritation of her legs as the day goes on. History of venous ulcers.    Winter 2017 we did do a vascular ultrasound which did show some venous reflux. No evidence of DVTs. We did have her seen again by vascular surgery. I do not have the consult back but patient states they told her her circulation was fine. He did not recommend any additional treatment at this point. She does not have a follow-up with them. She does continue to complain of swelling in her feet. She states her skin feels very sensitive. She does have some dependent rubor. She tells me her vascular surgeon told her to return to her primary care for ongoing treatment. Has noted she has not been wearing her support hose. She only takes the torsemide once a week because it makes her urinate frequently. After some discussion today she would be willing to increase this to twice weekly. I am checking follow-up lab work. Recent base metabolic profile thyroid functions blood counts essentially normal except mild increase in calcium 11.1. She was supposed to come back for repeat calcium and PTH. She did not. I am ordering that today    She has hypertension. On lisinopril. No current cardiac complaints. Most recent basic metabolic profile normal.  She has no cardiac complaints. Blood pressure is in good control on her current regimen. She has a history of hyperlipidemia. Intolerant to statins. Currently on TriCor. Most recent lipid panel acceptable with normal LFTs. We are continuing her medication    She has a history of some hyperglycemia but most recent glycohemoglobin normal 5.6    Patient does have anxiety/bipolar disease associated with insomnia and ADHD. She does followed with psychiatry. They continue to monitor and adjust medications to their office    She has remote history of skin cancer. She has had multiple skin lesions removed over the years. The dermatologist did start her on Retin-A which has helped. Her primary care providers had been writing for this.   She does plan to discuss this with her dermatologist as well. She also has some venous stasis dermatitis lower extremities. She uses Lac-Hydrin for that. Patient states she was seen by dermatology spring 2017. Had several biopsies which were benign    She has a history of a malignant polyp removed from her colon in 1982. She needs periodic surveillance colonoscopies. Patient did undergo repeat colonoscopy February 2017. Several polyps were removed. These were benign. She was told by the surgeon that she should have a repeat scope in 5 years    She does have a history of osteoporosis. Confirmed on recent DEXA scan 2016. She is on vitamin D replacement. Calcium was recently started but patient held this when she heard her calcium was a bit high. .  She was not felt to be an acceptable candidate for Fosamax as she has extensive dental work and is going to be undergoing extractions. Recent vitamin D levels improved and acceptable 27.3    She does have restless leg syndrome treated with Requip. She states this works quite well. She has chronic back pain secondary to lumbar spondylosis. She has had surgery on her back in the past for disc disease. She does have ongoing pain in her back. She has been intolerant to multiple pain medications. She was getting Liberal through this office. We have gradually been decreasing her dose. Her last prescription was for 30 tablets in July. She does have a pain contract on file. She is also been seeing a back specialist in [place]. She is now getting gabapentin from his office. He also wrote for a small supply of tramadol in August.  I explained to the patient that has as he is now prescribing medication for her back pain she needs to get her follow-up medications for pain from him    She does have osteoarthritis in her knees. She is currently following with orthopedist.  No acute complaints today    Remote history of GERD no current complaints    She has a history of cataracts.   She tells me they are contemplating cataract surgery      Patient did see gynecology spring 2017. They did give her Estrace ring. She was having some menopausal symptoms. She on her own decided to remove the ring because she was worried about the estrogen therapy and the risks therein. She is going to call and talk with them about that. Recent mammogram September 2016 normal.  Repeat mammogram ordered today    She has had a tetanus booster in 2005. She has had 2 pneumonia shots. She has had the flu shot this year. She recently got the Zostavax        Review of Systems:      Review of systems negative except as noted above    Physical Exam:  Visit Vitals    /71 (BP 1 Location: Right arm, BP Patient Position: Sitting)    Pulse 96    Temp 96.9 °F (36.1 °C) (Oral)    Resp 18    Wt 183 lb 3.2 oz (83.1 kg)    SpO2 96%    BMI 29.57 kg/m2     Vitals:    08/30/17 1131   BP: 110/71   BP 1 Location: Right arm   BP Patient Position: Sitting   Pulse: 96   Resp: 18   Temp: 96.9 °F (36.1 °C)   TempSrc: Oral   SpO2: 96%   Weight: 183 lb 3.2 oz (83.1 kg)    Patient no acute distress vital signs stable as above. Head is normocephalic  Patient dressed appropriately. Good eye contact. Did not appear anxious or distressed. No psychotic or suicidal ideations  Neck no nodes no masses no bruits. No goiter  Chest was clear no wheezes rhonchi or rales. Good air exchange  Cor regular rate and rhythm no S3-S4 no murmurs  Abdomen obese no HSM no masses soft and was nontender  Extremities had trace pretibial edema. Some dependent rubor noted. Good pedal pulses and capillary refill. Dry skin. 1. Venous insufficiency  Patient with venous insufficiency and chronic lower extremity edema. Has had several evaluations by vascular surgery. She is on spironolactone. I encouraged her to keep her feet elevated, use her support hose. She may increase her torsemide to twice weekly as needed. Checking lab work today.   I will see her back in a month for follow-up  - METABOLIC PANEL, BASIC  - torsemide (DEMADEX) 20 mg tablet; Take 1 tab for leg swelling PRN once or twice weekly  Indications: Edema  Dispense: 10 Tab; Refill: 1    2. Hypercalcemia  Checking lab work  - PTH INTACT    3. Essential hypertension  Controlled on her current regimen    4. Hyperlipidemia, unspecified hyperlipidemia type  Controlled on her current regiment    5. Localized edema  Increasing torsemide    6. Screening for breast cancer    - [facility] MAMMO BI SCREENING INCL CAD; Future    7. Right leg swelling    - torsemide (DEMADEX) 20 mg tablet; Take 1 tab for leg swelling PRN once or twice weekly  Indications: Edema  Dispense: 10 Tab; Refill: 1        Patient Instructions   Same meds  Take torsemide once or twice weekly  Elevate legs  Support hose    In future get pain meds for your back from Dr Delmis Law    Lab work today    Follow up oct          Continue current therapy plan except for indicated above. Verbal and written instructions (see AVS) provided.  Patient expresses understanding of diagnosis and treatment plan. Follow-up Disposition:  Return in about 5 weeks (around 10/4/2017). Quinton Márquez.  Mary Anne Fairchild MD

## 2017-08-31 LAB
BUN SERPL-MCNC: 20 MG/DL (ref 8–27)
BUN/CREAT SERPL: 26 (ref 12–28)
CALCIUM SERPL-MCNC: 10.8 MG/DL (ref 8.7–10.3)
CHLORIDE SERPL-SCNC: 99 MMOL/L (ref 96–106)
CO2 SERPL-SCNC: 21 MMOL/L (ref 18–29)
CREAT SERPL-MCNC: 0.76 MG/DL (ref 0.57–1)
GLUCOSE SERPL-MCNC: 94 MG/DL (ref 65–99)
POTASSIUM SERPL-SCNC: 4.4 MMOL/L (ref 3.5–5.2)
PTH-INTACT SERPL-MCNC: NORMAL PG/ML
SODIUM SERPL-SCNC: 136 MMOL/L (ref 134–144)

## 2017-09-05 ENCOUNTER — TELEPHONE (OUTPATIENT)
Dept: FAMILY MEDICINE CLINIC | Age: 72
End: 2017-09-05

## 2017-09-05 RX ORDER — ACYCLOVIR 50 MG/G
OINTMENT TOPICAL
Qty: 2 G | Refills: 1 | Status: SHIPPED | OUTPATIENT
Start: 2017-09-05 | End: 2018-05-03

## 2017-09-05 NOTE — TELEPHONE ENCOUNTER
Pt would like topical zovirax?  Please advise    Prescription for topical Zovirax sent to her pharmacy    Please notify patient    She should follow-up if symptoms persist

## 2017-09-05 NOTE — TELEPHONE ENCOUNTER
Pt reports having a cold sore. She ould like a refill of a medication not on her med list. She only uses this prn and hasn't had a new tube in 16 years. The medicine is Zovirax topical. Please call her if you can do this.

## 2017-09-06 ENCOUNTER — TELEPHONE (OUTPATIENT)
Dept: FAMILY MEDICINE CLINIC | Age: 72
End: 2017-09-06

## 2017-09-06 ENCOUNTER — DOCUMENTATION ONLY (OUTPATIENT)
Dept: FAMILY MEDICINE CLINIC | Age: 72
End: 2017-09-06

## 2017-09-06 NOTE — PROGRESS NOTES
Follow-up basic metabolic profile improved from last check   all tests normal except mild increase in her calcium which is improved from previous and stable over time     I suspect this is most likely due to her diuretic therapy and vitamin D deficiency  I did want PTH done with the above labs but a specimen was not sent to lab core for this    Please contact the patient and ask if she could return to the office for repeat nonfasting lab work  I want to check her ionized calcium, PTH, vitamin D levels  She should keep her plan follow-up with me later on this month

## 2017-09-06 NOTE — TELEPHONE ENCOUNTER
Pt returned my call, notified pt of zovirax available for p/u at pharmacy.  Pt will come in to office if s/s persist

## 2017-09-08 NOTE — PROGRESS NOTES
I called and spoke to this patient, advised her of lab results per Dr Patty Mcgowan review. Patient will RTC for further lab draw early next week.   Armida Yoder RN

## 2017-09-19 ENCOUNTER — CLINICAL SUPPORT (OUTPATIENT)
Dept: FAMILY MEDICINE CLINIC | Age: 72
End: 2017-09-19

## 2017-09-19 DIAGNOSIS — R60.0 LOCALIZED EDEMA: ICD-10-CM

## 2017-09-19 DIAGNOSIS — E56.9 VITAMIN DEFICIENCY: Primary | ICD-10-CM

## 2017-09-19 DIAGNOSIS — E83.52 HYPERCALCEMIA: ICD-10-CM

## 2017-09-22 LAB
25(OH)D3+25(OH)D2 SERPL-MCNC: 31.1 NG/ML (ref 30–100)
CA-I SERPL ISE-MCNC: 5.9 MG/DL (ref 4.5–5.6)
PHOSPHATE SERPL-MCNC: 2.7 MG/DL (ref 2.5–4.5)
PTH-INTACT SERPL-MCNC: 33 PG/ML (ref 15–65)

## 2017-09-25 NOTE — PROGRESS NOTES
Minimal elevation in calcium/ionized calcium with normal phosphorus and normal PTH and normal vitamin D  Likely secondary to diuretic use  We will continue to follow  Patient has follow-up in a.m. to discuss

## 2017-09-26 ENCOUNTER — OFFICE VISIT (OUTPATIENT)
Dept: FAMILY MEDICINE CLINIC | Age: 72
End: 2017-09-26

## 2017-09-26 VITALS
TEMPERATURE: 98.2 F | HEIGHT: 66 IN | HEART RATE: 80 BPM | DIASTOLIC BLOOD PRESSURE: 73 MMHG | BODY MASS INDEX: 29.73 KG/M2 | SYSTOLIC BLOOD PRESSURE: 108 MMHG | RESPIRATION RATE: 18 BRPM | WEIGHT: 185 LBS | OXYGEN SATURATION: 95 %

## 2017-09-26 DIAGNOSIS — R60.0 LOCALIZED EDEMA: Primary | ICD-10-CM

## 2017-09-26 DIAGNOSIS — Z23 ENCOUNTER FOR IMMUNIZATION: ICD-10-CM

## 2017-09-26 DIAGNOSIS — E83.52 HYPERCALCEMIA: ICD-10-CM

## 2017-09-26 DIAGNOSIS — I87.2 VENOUS INSUFFICIENCY: ICD-10-CM

## 2017-09-26 DIAGNOSIS — I87.2 VENOUS STASIS DERMATITIS OF BOTH LOWER EXTREMITIES: ICD-10-CM

## 2017-09-26 DIAGNOSIS — I10 ESSENTIAL HYPERTENSION: ICD-10-CM

## 2017-09-26 RX ORDER — TRIAMCINOLONE ACETONIDE 1 MG/G
CREAM TOPICAL
Qty: 120 G | Refills: 2 | Status: SHIPPED | OUTPATIENT
Start: 2017-09-26 | End: 2018-12-07

## 2017-09-26 NOTE — PATIENT INSTRUCTIONS
Same meds  Elevate legs  Support hose  TAC cream for itchy rash on legs  Call if you want to return john Vasc surgery    Get memogram  Follow up GYN

## 2017-09-26 NOTE — MR AVS SNAPSHOT
Visit Information Date & Time Provider Department Dept. Phone Encounter #  
 9/26/2017  2:00 PM Terry Khan MD [address] [id number] Follow-up Instructions Return in about 5 months (around 2/26/2018). Follow-up and Disposition History Your Appointments 6/28/2018 10:30 AM  
Medicare Physical with Terry Khan MD  
[address] [place] [address]) Appt Note: Due for R Carol Ann Dixon [facility] [address] [phone]  
[phone]  
  
   
 [address] [address] Upcoming Health Maintenance Date Due INFLUENZA AGE 9 TO ADULT 8/1/2017 MEDICARE YEARLY EXAM 6/28/2018 BREAST CANCER SCRN MAMMOGRAM 9/6/2018 GLAUCOMA SCREENING Q2Y 2/1/2019 COLONOSCOPY 2/16/2020 DTaP/Tdap/Td series (2 - Td) 2/18/2025 Allergies as of 9/26/2017  Review Complete On: 9/26/2017 By: Terry Khan MD  
  
 Severity Noted Reaction Type Reactions Levaquin [Levofloxacin]  06/10/2016    Other (comments) Muscle weakness Percocet [Oxycodone-acetaminophen]  05/09/2016    Other (comments)  
 hallucinate 
halucinations Statins-hmg-coa Reductase Inhibitors  06/10/2016    Other (comments) Muscle weakness Current Immunizations  Reviewed on 3/5/2014 Name Date Influenza High Dose Vaccine PF 9/26/2017 Influenza Vaccine 9/29/2015, 10/10/2013 Influenza Vaccine (Quad) 10/8/2014  4:53 PM  
 Influenza Vaccine (Quad) PF 10/27/2015 Pneumococcal Conjugate (PCV-13) 3/8/2016 Pneumococcal Polysaccharide (PPSV-23) 10/1/2014 Td 10/10/2013 Tdap 2/18/2015 Not reviewed this visit You Were Diagnosed With   
  
 Codes Comments Localized edema    -  Primary ICD-10-CM: R60.0 ICD-9-CM: 464. 3 Venous insufficiency     ICD-10-CM: I87.2 ICD-9-CM: 459.81  Hypercalcemia     ICD-10-CM: T86.24 
ICD-9-CM: 275.42   
 Essential hypertension     ICD-10-CM: I10 
ICD-9-CM: 401.9 Venous stasis dermatitis of both lower extremities     ICD-10-CM: I87.2 ICD-9-CM: 454.1 Encounter for immunization     ICD-10-CM: A94 ICD-9-CM: V03.89 Vitals BP Pulse Temp Resp Height(growth percentile) Weight(growth percentile) 108/73 (BP 1 Location: Right arm, BP Patient Position: Sitting) 80 98.2 °F (36.8 °C) (Oral) 18 5' 6\" (1.676 m) 185 lb (83.9 kg) SpO2 BMI OB Status Smoking Status 95% 29.86 kg/m2 Postmenopausal Former Smoker Vitals History BMI and BSA Data Body Mass Index Body Surface Area  
 29.86 kg/m 2 1.98 m 2 Preferred Pharmacy Pharmacy Name Phone [facility],[address], [address] [place] [address]  [phone] Your Updated Medication List  
  
   
This list is accurate as of: 9/26/17  3:15 PM.  Always use your most recent med list.  
  
  
  
  
 acyclovir 5 % ointment Commonly known as:  ZOVIRAX Apply  to affected area every three (3) hours. ammonium lactate 12 % lotion Commonly known as:  LAC-HYDRIN  
rub in to affected area well twice daily  
  
 calcium carbonate-vitamin D3 600 mg(1,500mg) -800 unit Tab Take 600 mg by mouth daily. cholecalciferol 1,000 unit tablet Commonly known as:  VITAMIN D3 Take 2 Tabs by mouth daily. ESTRING 2 mg (7.5 mcg /24 hour) vaginal ring Generic drug:  estradiol Insert 2 mg into vagina.  
  
 eszopiclone 2 mg tablet Commonly known as:  Sayra Altman Take 1 Tab by mouth nightly as needed for Sleep. Max Daily Amount: 2 mg. fenofibrate nanocrystallized 145 mg tablet Commonly known as:  TRICOR  
TAKE ONE TABLET BY MOUTH DAILY  
  
 gabapentin 100 mg capsule Commonly known as:  NEURONTIN Take  by mouth three (3) times daily. lisinopril 20 mg tablet Commonly known as:  Wendy Nick Take 1 Tab by mouth daily. methylphenidate HCl 20 mg Cb24 Take 20 mg by mouth two (2) times a day. Max Daily Amount: 40 mg  
  
 nortriptyline 25 mg capsule Commonly known as:  PAMELOR Take 1 Cap by mouth nightly. potassium chloride 20 mEq tablet Commonly known as:  K-DUR, KLOR-CON Take 1 Tab by mouth daily. RESTASIS 0.05 % ophthalmic emulsion Generic drug:  cycloSPORINE  
RESTASIS 0.05 % EMUL  
  
 rOPINIRole 1 mg tablet Commonly known as:  Priscilla Ports Take 0.5 Tabs by mouth three (3) times daily. spironolactone 50 mg tablet Commonly known as:  ALDACTONE Take 1 Tab by mouth daily. torsemide 20 mg tablet Commonly known as:  DEMADEX Take 1 tab for leg swelling PRN once or twice weekly  Indications: Edema  
  
 tretinoin 0.1 % topical cream  
Commonly known as:  RETIN-A  
APPLY TO AFFECTED AREA EVERY NIGHT  
  
 triamcinolone acetonide 0.1 % topical cream  
Commonly known as:  KENALOG Apply  to affected area two (2) times daily as needed for Skin Irritation. use thin layer  
  
 venlafaxine- mg capsule Commonly known as:  EFFEXOR XR Take 1 Cap by mouth two (2) times a day. Prescriptions Sent to Pharmacy Refills  
 triamcinolone acetonide (KENALOG) 0.1 % topical cream 2 Sig: Apply  to affected area two (2) times daily as needed for Skin Irritation. use thin layer Class: Normal  
 Pharmacy: [address] [place] [address],[address], [address] [place] [address] Dr Simmons #: [phone] Route: Topical  
  
We Performed the Following INFLUENZA VIRUS VACCINE, HIGH DOSE SEASONAL, PRESERVATIVE FREE [84608 CPT(R)] Follow-up Instructions Return in about 5 months (around 2/26/2018). To-Do List   
 09/28/2017 3:30 PM  
  Appointment with Kathy Hughes MD at [address] [place] [address] ([phone]) Patient Instructions Same meds Elevate legs Support hose 
TAC cream for itchy rash on legs Call if you want to return john Vasc surgery Get memogram 
Follow up GYN Introducing [facility] & HEALTH SERVICES! [facility] introduces [employer] patient portal. Now you can access parts of your medical record, email your doctor's office, and request medication refills online. 1. In your internet browser, go to [email or web address] 2. Click on the First Time User? Click Here link in the Sign In box. You will see the New Member Sign Up page. 3. Enter your [employer] Access Code exactly as it appears below. You will not need to use this code after youve completed the sign-up process. If you do not sign up before the expiration date, you must request a new code. · [employer] Access Code: [email or web address] Expires: 12/21/2017  8:53 AM 
 
4. Enter the last four digits of your Social Security Number (xxxx) and Date of Birth (mm/dd/yyyy) as indicated and click Submit. You will be taken to the next sign-up page. 5. Create a [employer] ID. This will be your [employer] login ID and cannot be changed, so think of one that is secure and easy to remember. 6. Create a [employer] password. You can change your password at any time. 7. Enter your Password Reset Question and Answer. This can be used at a later time if you forget your password. 8. Enter your e-mail address. You will receive e-mail notification when new information is available in [address] 9. Click Sign Up. You can now view and download portions of your medical record. 10. Click the Download Summary menu link to download a portable copy of your medical information. If you have questions, please visit the Frequently Asked Questions section of the [employer] website. Remember, [employer] is NOT to be used for urgent needs. For medical emergencies, dial 911. Now available from your iPhone and Android! Please provide this summary of care documentation to your next provider. Your primary care clinician is listed as Van Arredondo. If you have any questions after today's visit, please call [phone].

## 2017-11-01 RX ORDER — FENOFIBRATE 145 MG/1
TABLET, COATED ORAL
Qty: 90 TAB | Refills: 1 | Status: SHIPPED | OUTPATIENT
Start: 2017-11-01 | End: 2018-04-12 | Stop reason: SDUPTHER

## 2017-11-01 NOTE — TELEPHONE ENCOUNTER
Fenofibrate refilled ×6 months  Lab work acceptable over the summer  Patient has follow-up in the spring

## 2018-01-08 NOTE — PROGRESS NOTES
I have mailed a letter to this patient to call the office to discuss if/when she has had this test done.

## 2018-01-24 RX ORDER — GABAPENTIN 100 MG/1
CAPSULE ORAL 3 TIMES DAILY
OUTPATIENT
Start: 2018-01-24

## 2018-01-24 NOTE — TELEPHONE ENCOUNTER
Requested Prescriptions     Pending Prescriptions Disp Refills    gabapentin (NEURONTIN) 100 mg capsule       Sig: Take  by mouth three (3) times daily.

## 2018-01-24 NOTE — TELEPHONE ENCOUNTER
Received request for refill of patient's gabapentin    Patient has been getting her gabapentin from her pain management provider and she should contact them for refill

## 2018-01-25 NOTE — TELEPHONE ENCOUNTER
I have called and talked to this patient, advised her that she will need to contact her pain management provider for this refill. Patient verbalizes understanding.

## 2018-03-28 RX ORDER — POTASSIUM CHLORIDE 20 MEQ/1
TABLET, EXTENDED RELEASE ORAL
Qty: 30 TAB | Refills: 0 | Status: SHIPPED | OUTPATIENT
Start: 2018-03-28 | End: 2018-04-12 | Stop reason: SDUPTHER

## 2018-03-28 NOTE — TELEPHONE ENCOUNTER
Request for potassium okay ×1 month  Patient canceled her appointment with me earlier this week and has rescheduled for April

## 2018-04-12 ENCOUNTER — OFFICE VISIT (OUTPATIENT)
Dept: FAMILY MEDICINE CLINIC | Age: 73
End: 2018-04-12

## 2018-04-12 VITALS
TEMPERATURE: 98.8 F | DIASTOLIC BLOOD PRESSURE: 90 MMHG | RESPIRATION RATE: 16 BRPM | BODY MASS INDEX: 28.61 KG/M2 | SYSTOLIC BLOOD PRESSURE: 136 MMHG | HEART RATE: 80 BPM | OXYGEN SATURATION: 97 % | HEIGHT: 66 IN | WEIGHT: 178 LBS

## 2018-04-12 DIAGNOSIS — B35.1 ONYCHOMYCOSIS: ICD-10-CM

## 2018-04-12 DIAGNOSIS — G25.81 RLS (RESTLESS LEGS SYNDROME): ICD-10-CM

## 2018-04-12 DIAGNOSIS — E56.9 VITAMIN DEFICIENCY: ICD-10-CM

## 2018-04-12 DIAGNOSIS — E78.5 HYPERLIPIDEMIA, UNSPECIFIED HYPERLIPIDEMIA TYPE: ICD-10-CM

## 2018-04-12 DIAGNOSIS — R60.0 LOCALIZED EDEMA: ICD-10-CM

## 2018-04-12 DIAGNOSIS — M79.89 RIGHT LEG SWELLING: ICD-10-CM

## 2018-04-12 DIAGNOSIS — Z12.31 ENCOUNTER FOR SCREENING MAMMOGRAM FOR BREAST CANCER: ICD-10-CM

## 2018-04-12 DIAGNOSIS — I10 ESSENTIAL HYPERTENSION: Primary | ICD-10-CM

## 2018-04-12 DIAGNOSIS — M81.0 OSTEOPOROSIS WITHOUT CURRENT PATHOLOGICAL FRACTURE, UNSPECIFIED OSTEOPOROSIS TYPE: ICD-10-CM

## 2018-04-12 DIAGNOSIS — I87.2 VENOUS INSUFFICIENCY: ICD-10-CM

## 2018-04-12 DIAGNOSIS — I73.00 RAYNAUD'S DISEASE WITHOUT GANGRENE: ICD-10-CM

## 2018-04-12 DIAGNOSIS — Z12.39 SCREENING FOR BREAST CANCER: ICD-10-CM

## 2018-04-12 DIAGNOSIS — M81.0 AGE-RELATED OSTEOPOROSIS WITHOUT CURRENT PATHOLOGICAL FRACTURE: ICD-10-CM

## 2018-04-12 RX ORDER — HYDROXYZINE PAMOATE 25 MG/1
25 CAPSULE ORAL AS NEEDED
COMMUNITY
End: 2019-09-20 | Stop reason: SDUPTHER

## 2018-04-12 RX ORDER — ROPINIROLE 1 MG/1
TABLET, FILM COATED ORAL
Qty: 135 TAB | Refills: 1 | Status: SHIPPED | OUTPATIENT
Start: 2018-04-12

## 2018-04-12 RX ORDER — LISINOPRIL 20 MG/1
TABLET ORAL
Qty: 90 TAB | Refills: 1 | Status: SHIPPED | OUTPATIENT
Start: 2018-04-12 | End: 2018-06-05 | Stop reason: ALTCHOICE

## 2018-04-12 RX ORDER — SPIRONOLACTONE 50 MG/1
TABLET, FILM COATED ORAL
Qty: 90 TAB | Refills: 1 | Status: SHIPPED | OUTPATIENT
Start: 2018-04-12 | End: 2018-06-05 | Stop reason: ALTCHOICE

## 2018-04-12 RX ORDER — PROGESTERONE 100 MG/1
100 CAPSULE ORAL DAILY
COMMUNITY
End: 2018-12-07

## 2018-04-12 RX ORDER — POTASSIUM CHLORIDE 20 MEQ/1
TABLET, EXTENDED RELEASE ORAL
Qty: 90 TAB | Refills: 1 | Status: SHIPPED | OUTPATIENT
Start: 2018-04-12 | End: 2018-06-05 | Stop reason: ALTCHOICE

## 2018-04-12 RX ORDER — CYCLOSPORINE 0.5 MG/ML
1 EMULSION OPHTHALMIC 2 TIMES DAILY
COMMUNITY

## 2018-04-12 RX ORDER — MELATONIN
2000 DAILY
Qty: 180 TAB | Refills: 1 | Status: SHIPPED | OUTPATIENT
Start: 2018-04-12 | End: 2018-04-13 | Stop reason: DRUGHIGH

## 2018-04-12 RX ORDER — TORSEMIDE 20 MG/1
TABLET ORAL
Qty: 30 TAB | Refills: 1 | Status: SHIPPED | OUTPATIENT
Start: 2018-04-12 | End: 2018-06-05 | Stop reason: SDUPTHER

## 2018-04-12 RX ORDER — FENOFIBRATE 145 MG/1
TABLET, COATED ORAL
Qty: 90 TAB | Refills: 1 | Status: SHIPPED | OUTPATIENT
Start: 2018-04-12

## 2018-04-12 NOTE — MR AVS SNAPSHOT
[facility] 
[phone] Patient: Mahad Andino MRN: [id number] VHI:[id number] Visit Information Date & Time Provider Department Dept. Phone Encounter #  
 4/12/2018  3:20 PM Rick Castanon MD [address] [place] [address] [phone] [id number] Follow-up Instructions Return in about 4 months (around 8/12/2018). Follow-up and Disposition History Your Appointments 6/28/2018 10:40 AM  
Any with Rick Castanon MD  
[address] [place] [address] Paola Salines) Appt Note: 5 mo f/u,CP$0/9.26.217; 5 mo f/u,CP$0/9.26.217  
 [facility] [address] [phone]  
[phone]  
  
   
 [address] [facility] [address] Upcoming Health Maintenance Date Due  
 MEDICARE YEARLY EXAM 6/28/2018 BREAST CANCER SCRN MAMMOGRAM 9/6/2018 GLAUCOMA SCREENING Q2Y 2/1/2019 COLONOSCOPY 2/16/2020 DTaP/Tdap/Td series (2 - Td) 2/18/2025 Allergies as of 4/12/2018  Review Complete On: 4/12/2018 By: Rick Castanon MD  
  
 Severity Noted Reaction Type Reactions Levaquin [Levofloxacin]  06/10/2016    Other (comments) Muscle weakness Percocet [Oxycodone-acetaminophen]  05/09/2016    Other (comments)  
 hallucinate 
halucinations Statins-hmg-coa Reductase Inhibitors  06/10/2016    Other (comments) Muscle weakness Current Immunizations  Reviewed on 3/5/2014 Name Date Influenza High Dose Vaccine PF 9/26/2017 Influenza Vaccine 9/29/2015, 10/10/2013 Influenza Vaccine (Quad) 10/8/2014  4:53 PM  
 Influenza Vaccine (Quad) PF 10/27/2015 Pneumococcal Conjugate (PCV-13) 3/8/2016 Pneumococcal Polysaccharide (PPSV-23) 10/1/2014 Td 10/10/2013 Tdap 2/18/2015 Not reviewed this visit You Were Diagnosed With   
  
 Codes Comments Essential hypertension    -  Primary ICD-10-CM: I10 
ICD-9-CM: 401.9 Hyperlipidemia, unspecified hyperlipidemia type     ICD-10-CM: E78.5 ICD-9-CM: 272.4 Venous insufficiency     ICD-10-CM: I87.2 ICD-9-CM: 459.81   
 RLS (restless legs syndrome)     ICD-10-CM: G25.81 ICD-9-CM: 333.94 Localized edema     ICD-10-CM: R60.0 ICD-9-CM: 685. 3 Vitamin deficiency     ICD-10-CM: E56.9 ICD-9-CM: 269.2 Screening for breast cancer     ICD-10-CM: Z12.31 
ICD-9-CM: V76.10 Osteoporosis without current pathological fracture, unspecified osteoporosis type     ICD-10-CM: M81.0 ICD-9-CM: 733.00 Encounter for screening mammogram for breast cancer     ICD-10-CM: Z12.31 
ICD-9-CM: V76.12 Onychomycosis     ICD-10-CM: B35.1 ICD-9-CM: 110.1 Right leg swelling     ICD-10-CM: M79.89 ICD-9-CM: 729.81 Age-related osteoporosis without current pathological fracture     ICD-10-CM: M81.0 ICD-9-CM: 733.01 Raynaud's disease without gangrene     ICD-10-CM: I73.00 ICD-9-CM: 443.0 Vitals BP Pulse Temp Resp Height(growth percentile) Weight(growth percentile) 136/90 (BP 1 Location: Left arm, BP Patient Position: Sitting) 80 98.8 °F (37.1 °C) (Oral) 16 5' 6\" (1.676 m) 178 lb (80.7 kg) SpO2 BMI OB Status Smoking Status 97% 28.73 kg/m2 Postmenopausal Former Smoker Vitals History BMI and BSA Data Body Mass Index Body Surface Area 28.73 kg/m 2 1.94 m 2 Preferred Pharmacy Pharmacy Name Phone [facility],[address], [address] [place] [address]  [phone] Your Updated Medication List  
  
   
This list is accurate as of 4/12/18  4:36 PM.  Always use your most recent med list.  
  
  
  
  
 acyclovir 5 % ointment Commonly known as:  ZOVIRAX Apply  to affected area every three (3) hours. ammonium lactate 12 % lotion Commonly known as:  LAC-HYDRIN  
rub in to affected area well twice daily  
  
 cholecalciferol 1,000 unit tablet Commonly known as:  VITAMIN D3 Take 2 Tabs by mouth daily. ESTRING 2 mg (7.5 mcg /24 hour) vaginal ring Generic drug:  estradiol Insert 2 mg into vagina.  
  
 eszopiclone 1 mg tablet Commonly known as:  [place] Ambrosia Take 1 Tab by mouth nightly as needed for Sleep. Max Daily Amount: 1 mg. fenofibrate nanocrystallized 145 mg tablet Commonly known as:  TRICOR  
TAKE ONE TABLET BY MOUTH DAILY  
  
 gabapentin 100 mg capsule Commonly known as:  NEURONTIN Take  by mouth three (3) times daily. lisinopril 20 mg tablet Commonly known as:  PRINIVIL, ZESTRIL  
TAKE ONE TABLET BY MOUTH DAILY  
  
 methylphenidate HCl 20 mg tablet Commonly known as:  RITALIN Take 1 Tab (20 mg total) by mouth two (2) times a day. Max Daily Amount: 40 mg  
  
 potassium chloride 20 mEq tablet Commonly known as:  K-DUR, KLOR-CON  
TAKE ONE TABLET BY MOUTH DAILY  
  
 progesterone 100 mg capsule Commonly known as:  PROMETRIUM Take 100 mg by mouth daily. RESTASIS 0.05 % ophthalmic emulsion Generic drug:  cycloSPORINE Administer 1 Drop to both eyes two (2) times a day. rOPINIRole 1 mg tablet Commonly known as:  REQUIP  
TAKE 1/2 TABLET BY MOUTH 3 TIMES A DAY  
  
 spironolactone 50 mg tablet Commonly known as:  ALDACTONE  
TAKE ONE TABLET BY MOUTH DAILY  
  
 torsemide 20 mg tablet Commonly known as:  DEMADEX TAKE ONE TABLET BY MOUTH AS NEEDED FOR LEG SWELLING ONCE OR TWICE WEEKLY  
  
 tretinoin 0.1 % topical cream  
Commonly known as:  RETIN-A  
APPLY TO AFFECTED AREA EVERY NIGHT  
  
 triamcinolone acetonide 0.1 % topical cream  
Commonly known as:  KENALOG Apply  to affected area two (2) times daily as needed for Skin Irritation. use thin layer  
  
 venlafaxine- mg capsule Commonly known as:  EFFEXOR XR Take 1 Cap by mouth two (2) times a day. VISTARIL 25 mg capsule Generic drug:  hydrOXYzine pamoate Take 25 mg by mouth three (3) times daily as needed for Itching. Prescriptions Sent to Pharmacy Refills  
 torsemide (DEMADEX) 20 mg tablet 1 Sig: TAKE ONE TABLET BY MOUTH AS NEEDED FOR LEG SWELLING ONCE OR TWICE WEEKLY Class: Normal  
 Pharmacy: [address] [place] [address] Dr Ph #: [phone]  
 spironolactone (ALDACTONE) 50 mg tablet 1 Sig: TAKE ONE TABLET BY MOUTH DAILY Class: Normal  
 Pharmacy: [address] [place] [address] Dr Ph #: [phone]  
 rOPINIRole (REQUIP) 1 mg tablet 1 Sig: TAKE 1/2 TABLET BY MOUTH 3 TIMES A DAY Class: Normal  
 Pharmacy: [address] [place] [address] Dr Ph #: [phone] potassium chloride (K-DUR, KLOR-CON) 20 mEq tablet 1 Sig: TAKE ONE TABLET BY MOUTH DAILY Class: Normal  
 Pharmacy: [address] [place] [address] Dr Ph #: [phone]  
 lisinopril (PRINIVIL, ZESTRIL) 20 mg tablet 1 Sig: TAKE ONE TABLET BY MOUTH DAILY Class: Normal  
 Pharmacy: [address] [place] [address] Dr Ph #: [phone]  
 fenofibrate nanocrystallized (TRICOR) 145 mg tablet 1 Sig: TAKE ONE TABLET BY MOUTH DAILY Class: Normal  
 Pharmacy: [address] [place] [address] Dr Ph #: [phone] cholecalciferol (VITAMIN D3) 1,000 unit tablet 1 Sig: Take 2 Tabs by mouth daily. Class: Normal  
 Pharmacy: [address] [place] [address] Dr Ph #: [phone] Route: Oral  
  
We Performed the Following CBC WITH AUTOMATED DIFF [10806 CPT(R)] COLLECTION VENOUS BLOOD,VENIPUNCTURE [id number] CPT(R)] LIPID PANEL [86686 CPT(R)] METABOLIC PANEL, COMPREHENSIVE [53665 CPT(R)] CO HANDLG&/OR CONVEY OF SPEC FOR TR OFFICE TO LAB [88519 CPT(R)] TSH 3RD GENERATION [66107 CPT(R)] URINALYSIS W/ RFLX MICROSCOPIC [18746 CPT(R)] VITAMIN D, 25 HYDROXY [id number] CPT(R)] Follow-up Instructions Return in about 4 months (around 8/12/2018). To-Do List   
 Around 06/12/2018 Imaging:  DEXA BONE DENSITY STUDY AXIAL Around 06/12/2018 Imaging:  ROSE MAMMO BI SCREENING INCL CAD Patient Instructions Continue current medications Work on diet and exercise Lab work today Keep planned follow up visit with psychiatry, vascular surgery, dermatology, back surgeon, Lori Styles for Pap May try Vicks applied to your toenail Get mammogram and DEXA scan Introducing \[facility]\"" & HEALTH SERVICES! Willadean Saint introduces [employer] patient portal. Now you can access parts of your medical record, email your doctor's office, and request medication refills online. 1. In your internet browser, go to [email or web address] 2. Click on the First Time User? Click Here link in the Sign In box. You will see the New Member Sign Up page. 3. Enter your [employer] Access Code exactly as it appears below. You will not need to use this code after youve completed the sign-up process. If you do not sign up before the expiration date, you must request a new code. · [employer] Access Code: [email or web address] Expires: 5/27/2018  1:46 PM 
 
4. Enter the last four digits of your Social Security Number (xxxx) and Date of Birth (mm/dd/yyyy) as indicated and click Submit. You will be taken to the next sign-up page. 5. Create a [employer] ID. This will be your [employer] login ID and cannot be changed, so think of one that is secure and easy to remember. 6. Create a [employer] password. You can change your password at any time. 7. Enter your Password Reset Question and Answer. This can be used at a later time if you forget your password. 8. Enter your e-mail address. You will receive e-mail notification when new information is available in [address] 9. Click Sign Up. You can now view and download portions of your medical record. 10. Click the Download Summary menu link to download a portable copy of your medical information.  
 
If you have questions, please visit the Frequently Asked Questions section of the ShareThis. Remember, [employer] is NOT to be used for urgent needs. For medical emergencies, dial 911. Now available from your iPhone and Android! Please provide this summary of care documentation to your next provider. Your primary care clinician is listed as Terry Khan. If you have any questions after today's visit, please call [phone].

## 2018-04-12 NOTE — PATIENT INSTRUCTIONS
Continue current medications    Work on diet and exercise    Lab work today    Keep planned follow up visit with psychiatry, vascular surgery, dermatology, back surgeon, Bishop Rush for Pap    May try Vicks applied to your toenail    Get mammogram and DEXA scan

## 2018-04-12 NOTE — LETTER
10/8/2018 [place] Velez [id number] At a recent visit,  you were given a prescription to have Mammogram done. As of this date our office has not yet received the results of the requested test/procedure. As your provider, I still want you to have this testing so please schedule. If you have had the test/procedure performed please contact our office so we can get the information and have your provider review the results. Results of all testing (normal or abnormal) will be reported to you by this office once received. Sincerely, Carlos Bravo MD  
[facility] [address] 
[phone]

## 2018-04-12 NOTE — PROGRESS NOTES
HPI:    Patient ID: Alexandru Healy is a 48year old female. HPI  This pleasant 49-year-old female presents as a new patient to me on consult from [address] [place] [address].   Patient's chief complaint is pain associated with her left great Review of Systems  I re Poppy Wells is a 68 y.o. female who presents to the office today with the following:  Chief Complaint   Patient presents with    Medication Refill     Hasn't seen a doctor in a long time and not sure what she needs       Allergies   Allergen Reactions    Levaquin [Levofloxacin] Other (comments)     Muscle weakness    Percocet [Oxycodone-Acetaminophen] Other (comments)     hallucinate  halucinations      Statins-Hmg-Coa Reductase Inhibitors Other (comments)     Muscle weakness       Current Outpatient Prescriptions   Medication Sig    progesterone (PROMETRIUM) 100 mg capsule Take 100 mg by mouth daily.  cycloSPORINE (RESTASIS) 0.05 % ophthalmic emulsion Administer 1 Drop to both eyes two (2) times a day.  hydrOXYzine pamoate (VISTARIL) 25 mg capsule Take 25 mg by mouth three (3) times daily as needed for Itching.  torsemide (DEMADEX) 20 mg tablet TAKE ONE TABLET BY MOUTH AS NEEDED FOR LEG SWELLING ONCE OR TWICE WEEKLY    spironolactone (ALDACTONE) 50 mg tablet TAKE ONE TABLET BY MOUTH DAILY    rOPINIRole (REQUIP) 1 mg tablet TAKE 1/2 TABLET BY MOUTH 3 TIMES A DAY    potassium chloride (K-DUR, KLOR-CON) 20 mEq tablet TAKE ONE TABLET BY MOUTH DAILY    lisinopril (PRINIVIL, ZESTRIL) 20 mg tablet TAKE ONE TABLET BY MOUTH DAILY    fenofibrate nanocrystallized (TRICOR) 145 mg tablet TAKE ONE TABLET BY MOUTH DAILY    cholecalciferol (VITAMIN D3) 1,000 unit tablet Take 2 Tabs by mouth daily.  methylphenidate HCl (RITALIN) 20 mg tablet Take 1 Tab (20 mg total) by mouth two (2) times a day. Max Daily Amount: 40 mg    venlafaxine-SR (EFFEXOR XR) 150 mg capsule Take 1 Cap by mouth two (2) times a day.  tretinoin (RETIN-A) 0.1 % topical cream APPLY TO AFFECTED AREA EVERY NIGHT    acyclovir (ZOVIRAX) 5 % ointment Apply  to affected area every three (3) hours.  gabapentin (NEURONTIN) 100 mg capsule Take  by mouth three (3) times daily.     ESTRING 2 mg (7.5 mcg /24 hour) vaginal ring Insert 2 encounter    Imaging & Referrals:  None       EM#[id number] mg into vagina.  ammonium lactate (LAC-HYDRIN) 12 % lotion rub in to affected area well twice daily    eszopiclone (LUNESTA) 1 mg tablet Take 1 Tab by mouth nightly as needed for Sleep. Max Daily Amount: 1 mg.  triamcinolone acetonide (KENALOG) 0.1 % topical cream Apply  to affected area two (2) times daily as needed for Skin Irritation. use thin layer     No current facility-administered medications for this visit. Past Medical History:   Diagnosis Date    Actinic keratosis     ADHD (attention deficit hyperactivity disorder)     Arthritis     Bipolar affective disorder ([facility] [address]     Dr. Billy Chan Colon cancer [facility])     polyps on scope 2/17    Depression     GERD (gastroesophageal reflux disease)     Insomnia     Osteoporosis     Dexa 9/6/16       Past Surgical History:   Procedure Laterality Date    HX BACK SURGERY      HX COLECTOMY  1982    HX GI      HX HEENT      HX MALIGNANT SKIN LESION EXCISION         History   Smoking Status    Former Smoker    Packs/day: 1.00    Quit date: 3/5/1970   Smokeless Tobacco    Never Used       Family History   Problem Relation Age of Onset   [facility] Cancer Father      melanoma, larynx    Cancer Mother     Hypertension Mother     Cancer Maternal Aunt      pancreatic    Cancer Maternal Grandmother      pancreatic         History of Present Illness:  Patient here for ongoing medical follow-up     Patient with long and complex medical history  She has not been in for some time because of insurance issues. She has needed to change several of her providers because of insurance issues    Patient with a history of chronic lower extremity edema and venous stasis. She has been evaluated by 2 vascular surgeons in the past for this. She has had workup in the past including normal MARISSA exam.  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 She is currently on Spironolactone daily and as needed torsemide.     She has not been wearing support hose because they are difficult for her to put on . Iman Luevano History of venous ulcers. Winter 2017 we did do a vascular ultrasound which did show some venous reflux. No evidence of DVTs. We did have her seen again by vascular surgery. Patient states they told her there were no surgical issues and  discharge her from the practice. Overall the swelling is doing reasonably well at this point. She has had no skin breakdown. Her feet remain quite violaceous which is worrisome and 1 of the reasons have had her see vascular surgery on several occasions. She is not having any pain. No claudication symptoms. She tells me her dermatologist has just referred her to another vascular surgeon and that appointment is coming up next week. I encouraged to keep that appointment. Patient does have a history of Raynaud's disease. This is also contributing to her lower extremity symptoms and exam.  Again patient does have an appointment next week with vascular surgery    She has hypertension. On lisinopril. No current cardiac complaints. Most recent basic metabolic profile essentially normal.  She has no cardiac complaints. Blood pressure has been n good control on her current regimen. Borderline readings today but we will follow. Lab work ordered    She has a history of hyperlipidemia. Intolerant to statins. Currently on TriCor. Most recent lipid panel acceptable with normal LFTs. We are continuing her medication. Lab work ordered today    She has a history of some hyperglycemia but most recent glycohemoglobin normal 5.6    She does have mild elevation in her serum calcium. PTH phosphorus and ionized calcium normal.  I suspect this is due to her diuretic use. We will continue to follow    Patient does have anxiety/bipolar disease associated with insomnia and ADHD. She does followed with psychiatry.   As noted she has had to switch to a new psychiatrist and will be seeing her next week    She has remote history of skin cancer. She has had multiple skin lesions removed over the years. The dermatologist did start her on Retin-A. She also has some venous stasis dermatitis lower extremities. She uses Lac-Hydrin for that. As noted above she continues to follow with them. They referred her back to vascular surgery. They recently recommended over-the-counter preparation called Greg Med which she felt helped her skin. She did ask about could write a prescription for her to see if it would be covered with her insurance. She has a history of a malignant polyp removed from her colon in 1982. She needs periodic surveillance colonoscopies. Patient did undergo repeat colonoscopy February 2017. Several polyps were removed. These were benign. She was told by the surgeon that she should have a repeat scope in 5 years    She does have a history of osteoporosis. Confirmed on recent DEXA scan 2016. She is on vitamin D replacement. Recent vitamin D levels normal.  Patient is on low-dose calcium supplementation. She was not felt to be an acceptable candidate for Fosamax as she has extensive dental work and is going to be undergoing extractions. She is not interested in the injectable treatments for osteoporosis. She is on estrogen. She was told recently her previous DEXA scan may not have been completely accurate because of prior back surgery. She would like a follow-up DEXA scan which is reasonable as it has been 2 years      She does have restless leg syndrome treated with Requip. She states this works quite well. Medication refilled today    She has chronic back pain secondary to lumbar spondylosis. She has had surgery on her back in the past for disc disease. She does have ongoing pain in her back. She has been intolerant to multiple pain medications. She continues to follow with her surgeon. They do have her on gabapentin.   She they did have her on low-dose narcotic pain medication in the past but not currently    She does have osteoarthritis in her knees. She is currently following with orthopedist.  No acute complaints today    Remote history of GERD no current complaints    She has a history of cataracts. She does follow with her eye doctor      Patient saw her gynecologist spring 2017. They did give her Estrace ring and Provera. She was having some menopausal symptoms. The estrogen should also help her osteoporosis. She would like to continue with this. She is no longer seeing her gynecologist.  She is in need of the pelvic and Pap. She request to see our PA Mery Edouard    Recent mammogram September 2016 normal.  Repeat mammogram  has been ordered but she has not gone for this yet. I told her she needs to do this especially if she wants to continue with the estrogen. Mammogram was reordered patient was found to have some minimal onychomycosis today. I did suggest some over-the-counter Vicks As I have seen this work and many of my patients    She has had a tetanus booster in 2005. She has had 2 pneumonia shots. She has had the flu shot earlier this season. She recently got the Zostavax        Review of Systems:      Review of systems negative except as noted above    Physical Exam:  Visit Vitals    /90 (BP 1 Location: Left arm, BP Patient Position: Sitting)    Pulse (!) 102    Temp 98.8 °F (37.1 °C) (Oral)    Resp 16    Ht 5' 6\" (1.676 m)    Wt 178 lb (80.7 kg)    SpO2 97%    BMI 28.73 kg/m2     Vitals:    04/12/18 1539   BP: 136/90   BP 1 Location: Left arm   BP Patient Position: Sitting   Pulse: (!) 102   Resp: 16   Temp: 98.8 °F (37.1 °C)   TempSrc: Oral   SpO2: 97%   Weight: 178 lb (80.7 kg)   Height: 5' 6\" (1.676 m)    Patient no acute distress vital signs stable as above. Head is normocephalic  Patient dressed appropriately. Good eye contact. Did not appear anxious or distressed. No psychotic or suicidal ideations  Eyes PERRLA. Nose within normal limits.   OP within normal limits. No obvious lesions. Extensive dental work  Neck no nodes no masses no bruits. No goiter  Chest was clear no wheezes rhonchi or rales. Good air exchange  Cor regular rate and rhythm no S3-S4 no murmurs  Abdomen obese no HSM no masses soft and was nontender  Extremities had trace pretibial edema. Marked dependent rubor noted. Good pedal pulses and capillary refill. Dry skin. Feet were warm and dry        1. Essential hypertension  Blood pressure acceptably controlled today. Patient's going to continue her current medications. Lab work today. She is going to follow with all the above providers. I like to see her back later on the summer for ongoing follow-up  - CBC WITH AUTOMATED DIFF  - METABOLIC PANEL, COMPREHENSIVE  - TSH 3RD GENERATION  - URINALYSIS W/ RFLX MICROSCOPIC  - potassium chloride (K-DUR, KLOR-CON) 20 mEq tablet; TAKE ONE TABLET BY MOUTH DAILY  Dispense: 90 Tab; Refill: 1  - lisinopril (PRINIVIL, ZESTRIL) 20 mg tablet; TAKE ONE TABLET BY MOUTH DAILY  Dispense: 90 Tab; Refill: 1  - ID HANDLG&/OR CONVEY OF SPEC FOR TR OFFICE TO LAB  - COLLECTION VENOUS BLOOD,VENIPUNCTURE    2. Hyperlipidemia, unspecified hyperlipidemia type    - LIPID PANEL  - fenofibrate nanocrystallized (TRICOR) 145 mg tablet; TAKE ONE TABLET BY MOUTH DAILY  Dispense: 90 Tab; Refill: 1  - ID HANDLG&/OR CONVEY OF SPEC FOR TR OFFICE TO LAB  - COLLECTION VENOUS BLOOD,VENIPUNCTURE    3. Venous insufficiency  I refilled her medication. She will keep her follow-up next week with vascular surgery. I stressed the importance of this given the appearance of her legs today  - torsemide (DEMADEX) 20 mg tablet; TAKE ONE TABLET BY MOUTH AS NEEDED FOR LEG SWELLING ONCE OR TWICE WEEKLY  Dispense: 30 Tab; Refill: 1    4. RLS (restless legs syndrome)    - rOPINIRole (REQUIP) 1 mg tablet; TAKE 1/2 TABLET BY MOUTH 3 TIMES A DAY  Dispense: 135 Tab; Refill: 1    5.  Localized edema  - spironolactone (ALDACTONE) 50 mg tablet; TAKE ONE TABLET BY MOUTH DAILY  Dispense: 90 Tab; Refill: 1    6. Vitamin deficiency    - VITAMIN D, 25 HYDROXY    7. Screening for breast cancer      8. Osteoporosis without current pathological fracture, unspecified osteoporosis type    - VITAMIN D, 25 HYDROXY  - DEXA BONE DENSITY STUDY AXIAL; Future    9. Encounter for screening mammogram for breast cancer    - [facility] MAMMO BI SCREENING INCL CAD; Future    10. Onychomycosis  Recommending Vicks    11. Right leg swelling    - torsemide (DEMADEX) 20 mg tablet; TAKE ONE TABLET BY MOUTH AS NEEDED FOR LEG SWELLING ONCE OR TWICE WEEKLY  Dispense: 30 Tab; Refill: 1    12. Age-related osteoporosis without current pathological fracture    - cholecalciferol (VITAMIN D3) 1,000 unit tablet; Take 2 Tabs by mouth daily. Dispense: 180 Tab; Refill: 1    13. Raynaud's disease without gangrene      Patient Instructions   Continue current medications    Work on diet and exercise    Lab work today    Keep planned follow up visit with psychiatry, vascular surgery, dermatology, back surgeon, Dexter Duane for Pap    May try Vicks applied to your toenail    Get mammogram and DEXA scan          Continue current therapy plan except for indicated above. Verbal and written instructions (see AVS) provided.  Patient expresses understanding of diagnosis and treatment plan. Follow-up Disposition:  Return in about 4 months (around 8/12/2018). Garth Muhammad.  Taylor Barrientos MD

## 2018-04-13 LAB
25(OH)D3+25(OH)D2 SERPL-MCNC: 29.1 NG/ML (ref 30–100)
ALBUMIN SERPL-MCNC: 4 G/DL (ref 3.5–4.8)
ALBUMIN/GLOB SERPL: 1.6 {RATIO} (ref 1.2–2.2)
ALP SERPL-CCNC: 57 IU/L (ref 39–117)
ALT SERPL-CCNC: 33 IU/L (ref 0–32)
APPEARANCE UR: CLEAR
AST SERPL-CCNC: 31 IU/L (ref 0–40)
BASOPHILS # BLD AUTO: 0 X10E3/UL (ref 0–0.2)
BASOPHILS NFR BLD AUTO: 0 %
BILIRUB SERPL-MCNC: 0.5 MG/DL (ref 0–1.2)
BILIRUB UR QL STRIP: NEGATIVE
BUN SERPL-MCNC: 19 MG/DL (ref 8–27)
BUN/CREAT SERPL: 28 (ref 12–28)
CALCIUM SERPL-MCNC: 10.9 MG/DL (ref 8.7–10.3)
CHLORIDE SERPL-SCNC: 101 MMOL/L (ref 96–106)
CHOLEST SERPL-MCNC: 139 MG/DL (ref 100–199)
CO2 SERPL-SCNC: 23 MMOL/L (ref 18–29)
COLOR UR: YELLOW
CREAT SERPL-MCNC: 0.68 MG/DL (ref 0.57–1)
EOSINOPHIL # BLD AUTO: 0.1 X10E3/UL (ref 0–0.4)
EOSINOPHIL NFR BLD AUTO: 1 %
ERYTHROCYTE [DISTWIDTH] IN BLOOD BY AUTOMATED COUNT: 14.6 % (ref 12.3–15.4)
GFR SERPLBLD CREATININE-BSD FMLA CKD-EPI: 100 ML/MIN/1.73
GFR SERPLBLD CREATININE-BSD FMLA CKD-EPI: 87 ML/MIN/1.73
GLOBULIN SER CALC-MCNC: 2.5 G/DL (ref 1.5–4.5)
GLUCOSE SERPL-MCNC: 93 MG/DL (ref 65–99)
GLUCOSE UR QL: NEGATIVE
HCT VFR BLD AUTO: 38.3 % (ref 34–46.6)
HDLC SERPL-MCNC: 63 MG/DL
HGB BLD-MCNC: 13.3 G/DL (ref 11.1–15.9)
HGB UR QL STRIP: NEGATIVE
IMM GRANULOCYTES # BLD: 0 X10E3/UL (ref 0–0.1)
IMM GRANULOCYTES NFR BLD: 0 %
INTERPRETATION, 910389: NORMAL
KETONES UR QL STRIP: NEGATIVE
LDLC SERPL CALC-MCNC: 68 MG/DL (ref 0–99)
LEUKOCYTE ESTERASE UR QL STRIP: NEGATIVE
LYMPHOCYTES # BLD AUTO: 2.1 X10E3/UL (ref 0.7–3.1)
LYMPHOCYTES NFR BLD AUTO: 23 %
MCH RBC QN AUTO: 31.3 PG (ref 26.6–33)
MCHC RBC AUTO-ENTMCNC: 34.7 G/DL (ref 31.5–35.7)
MCV RBC AUTO: 90 FL (ref 79–97)
MICRO URNS: NORMAL
MONOCYTES # BLD AUTO: 0.8 X10E3/UL (ref 0.1–0.9)
MONOCYTES NFR BLD AUTO: 8 %
NEUTROPHILS # BLD AUTO: 6.2 X10E3/UL (ref 1.4–7)
NEUTROPHILS NFR BLD AUTO: 68 %
NITRITE UR QL STRIP: NEGATIVE
PH UR STRIP: 6.5 [PH] (ref 5–7.5)
PLATELET # BLD AUTO: 218 X10E3/UL (ref 150–379)
POTASSIUM SERPL-SCNC: 4.3 MMOL/L (ref 3.5–5.2)
PROT SERPL-MCNC: 6.5 G/DL (ref 6–8.5)
PROT UR QL STRIP: NEGATIVE
RBC # BLD AUTO: 4.25 X10E6/UL (ref 3.77–5.28)
SODIUM SERPL-SCNC: 141 MMOL/L (ref 134–144)
SP GR UR: 1.02 (ref 1–1.03)
TRIGL SERPL-MCNC: 41 MG/DL (ref 0–149)
TSH SERPL DL<=0.005 MIU/L-ACNC: 0.95 UIU/ML (ref 0.45–4.5)
UROBILINOGEN UR STRIP-MCNC: 1 MG/DL (ref 0.2–1)
VLDLC SERPL CALC-MCNC: 8 MG/DL (ref 5–40)
WBC # BLD AUTO: 9.2 X10E3/UL (ref 3.4–10.8)

## 2018-04-13 RX ORDER — CHOLECALCIFEROL (VITAMIN D3) 125 MCG
5000 CAPSULE ORAL DAILY
Qty: 30 CAP | Refills: 5 | Status: SHIPPED | OUTPATIENT
Start: 2018-04-13

## 2018-04-13 NOTE — PROGRESS NOTES
CBC and chemistry panel thyroid functions and urinalysis stable and acceptable  Mild increase in calcium secondary to diuretic use stable over time   lipids look excellent  Vitamin D level still a bit low on current replacement of 2000 units daily  Recommend she increase this to 5000 units daily  Prescription is been called to her pharmacy  She should keep her planned follow-up with Dr. Rajeev Astudillo and her other providers

## 2018-05-03 ENCOUNTER — OFFICE VISIT (OUTPATIENT)
Dept: FAMILY MEDICINE CLINIC | Age: 73
End: 2018-05-03

## 2018-05-03 VITALS
WEIGHT: 176 LBS | OXYGEN SATURATION: 96 % | RESPIRATION RATE: 18 BRPM | HEART RATE: 107 BPM | DIASTOLIC BLOOD PRESSURE: 76 MMHG | HEIGHT: 66 IN | BODY MASS INDEX: 28.28 KG/M2 | TEMPERATURE: 98.5 F | SYSTOLIC BLOOD PRESSURE: 132 MMHG

## 2018-05-03 DIAGNOSIS — F10.11 HISTORY OF ALCOHOL ABUSE: ICD-10-CM

## 2018-05-03 DIAGNOSIS — R26.89 BALANCE PROBLEMS: ICD-10-CM

## 2018-05-03 DIAGNOSIS — I87.2 VENOUS INSUFFICIENCY: ICD-10-CM

## 2018-05-03 DIAGNOSIS — F48.9 NONPSYCHOTIC MENTAL DISORDER: ICD-10-CM

## 2018-05-03 DIAGNOSIS — Z87.898 HISTORY OF INSOMNIA: ICD-10-CM

## 2018-05-03 DIAGNOSIS — R41.0 CONFUSION: Primary | ICD-10-CM

## 2018-05-03 DIAGNOSIS — F99 PSYCHIATRIC DISORDER: ICD-10-CM

## 2018-05-03 DIAGNOSIS — I73.9 PERIPHERAL VASCULAR DISEASE OF LOWER EXTREMITY (HCC): ICD-10-CM

## 2018-05-03 DIAGNOSIS — I10 ESSENTIAL HYPERTENSION: ICD-10-CM

## 2018-05-03 LAB — GLUCOSE POC: 143 MG/DL

## 2018-05-03 RX ORDER — METHYLPHENIDATE HYDROCHLORIDE 30 MG/1
CAPSULE, EXTENDED RELEASE ORAL
COMMUNITY
Start: 2018-04-16 | End: 2018-10-15

## 2018-05-03 RX ORDER — TEMAZEPAM 15 MG/1
CAPSULE ORAL
COMMUNITY
Start: 2018-04-16 | End: 2018-12-07

## 2018-05-03 NOTE — PROGRESS NOTES
Tyrone Phillips is a 68 y.o. female who presents to the office today with the following:  Chief Complaint   Patient presents with    Extremity Weakness     fell yesterday,legs gave out       Allergies   Allergen Reactions    Levaquin [Levofloxacin] Other (comments)     Muscle weakness    Percocet [Oxycodone-Acetaminophen] Other (comments)     hallucinate  halucinations      Statins-Hmg-Coa Reductase Inhibitors Other (comments)     Muscle weakness       Current Outpatient Prescriptions   Medication Sig    methylphenidate HCl (RITALIN SR) 30 mg LA capsule     cholecalciferol (VITAMIN D3) 5,000 unit capsule Take 1 Cap by mouth daily.  progesterone (PROMETRIUM) 100 mg capsule Take 100 mg by mouth daily.  cycloSPORINE (RESTASIS) 0.05 % ophthalmic emulsion Administer 1 Drop to both eyes two (2) times a day.  hydrOXYzine pamoate (VISTARIL) 25 mg capsule Take 25 mg by mouth as needed for Itching.  torsemide (DEMADEX) 20 mg tablet TAKE ONE TABLET BY MOUTH AS NEEDED FOR LEG SWELLING ONCE OR TWICE WEEKLY    spironolactone (ALDACTONE) 50 mg tablet TAKE ONE TABLET BY MOUTH DAILY    rOPINIRole (REQUIP) 1 mg tablet TAKE 1/2 TABLET BY MOUTH 3 TIMES A DAY    potassium chloride (K-DUR, KLOR-CON) 20 mEq tablet TAKE ONE TABLET BY MOUTH DAILY    lisinopril (PRINIVIL, ZESTRIL) 20 mg tablet TAKE ONE TABLET BY MOUTH DAILY    fenofibrate nanocrystallized (TRICOR) 145 mg tablet TAKE ONE TABLET BY MOUTH DAILY    venlafaxine-SR (EFFEXOR XR) 150 mg capsule Take 1 Cap by mouth two (2) times a day.  tretinoin (RETIN-A) 0.1 % topical cream APPLY TO AFFECTED AREA EVERY NIGHT    gabapentin (NEURONTIN) 100 mg capsule Take  by mouth three (3) times daily.  ESTRING 2 mg (7.5 mcg /24 hour) vaginal ring Insert 2 mg into vagina.     ammonium lactate (LAC-HYDRIN) 12 % lotion rub in to affected area well twice daily    temazepam (RESTORIL) 15 mg capsule     triamcinolone acetonide (KENALOG) 0.1 % topical cream Apply  to affected area two (2) times daily as needed for Skin Irritation. use thin layer     No current facility-administered medications for this visit. Past Medical History:   Diagnosis Date    Actinic keratosis     ADHD (attention deficit hyperactivity disorder)     Arthritis     Bipolar affective disorder ([facility] [address]     Dr. Neel Edward Colon cancer [facility])     polyps on scope 2/17    Depression     GERD (gastroesophageal reflux disease)     Insomnia     Osteoporosis     Dexa 9/6/16       Past Surgical History:   Procedure Laterality Date    HX BACK SURGERY      HX COLECTOMY  1982    HX GI      HX HEENT      HX MALIGNANT SKIN LESION EXCISION         History   Smoking Status    Former Smoker    Packs/day: 1.00    Quit date: 3/5/1970   Smokeless Tobacco    Never Used       Family History   Problem Relation Age of Onset   [address] [facility] Cancer Father      melanoma, larynx    Cancer Mother     Hypertension Mother     Cancer Maternal Aunt      pancreatic    Cancer Maternal Grandmother      pancreatic         History of Present Illness:  Patient here with her partner for evaluation ambulation and mentation issues    Yesterday patient experienced the onset of unsteadiness on her feet. She described this as muscle weakness but her muscles are actually quite strong. She was unable to stand by herself needing assistance to get to and from the bathroom. According to her apartment this is been associated with some confusion. She was making no sense yesterday. Her speech was quite garbled. She was incontinent of urine. Symptoms are some better this morning but she still needing to ambulate with a walker having difficult time maintaining her balance. She denies any headache visual changes or other neurologic complaints    Patient does have a long psychiatric history. She follows with her psychiatrist.  She is on multiple medications from them. She abruptly stopped her Ritalin a couple days ago.   She has had confusion issues in the past when stopping her Ritalin. Her psychiatrist is weaning her off Effexor. She did not take any Effexor yesterday which may lead to some rapid withdrawal symptoms. Patient also has a history of insomnia. She was on Lunesta. Psychiatry recently stopped that. They start her on temazepam.  She does not think she took it yesterday. Patient has had a history of alcohol abuse. I noted a bruise on her right eye. Her partner indicates she did fall a couple weeks ago and was inebriated at that time. She absolutely insists she has not had alcohol in the last 24 hours. I do however think I smell alcohol on her breath    She does have a history of dependent rubor lower extremity. She has been seen by multiple vascular surgeons. previous ultrasounds and ABIs have been negative. She just saw a new vascular surgeon. Ultrasound again negative for venous disease. He is ordering follow-up arteriovascular studies. He did try her on a new medication for possible arterial vascular disease. She does not remember the name. She is taking samples at home    She does have hypertension. Blood pressure is in good control. There was no orthostatic changes today. Pulse rate was up mildly.   Recent base metabolic profile was normal    She does have hyperlipidemia    She has had some neurologic complaints in the past.  There is a question of a CVA in the past but CT at that time was negative  She does have cardiovascular disease which does increase her risk of CVA      Review of Systems:    Review of systems negative except as noted above      Physical Exam:  Visit Vitals    /76 (BP 1 Location: Right arm, BP Patient Position: Sitting)    Pulse (!) 107    Temp 98.5 °F (36.9 °C) (Oral)    Resp 18    Ht 5' 6\" (1.676 m)    Wt 176 lb (79.8 kg)    SpO2 96%    BMI 28.41 kg/m2     Vitals:    05/03/18 1106   BP: 132/76   BP 1 Location: Right arm   BP Patient Position: Sitting   Pulse: (!) 107   Resp: 18   Temp: 98.5 °F (36.9 °C)   TempSrc: Oral   SpO2: 96%   Weight: 176 lb (79.8 kg)   Height: 5' 6\" (1.676 m)   Patient was in no acute distress. She was afebrile. Vital signs as above. On standing blood pressure was 155/80. Pulse remained just above 100  Head was normocephalic  Patient was alert but confused about the date today. Her speech was a bit slow and prolonged. It was clear and understandable  Eyes PERRLA  Neck no nodes no masses no bruits  Chest was clear no wheezes rhonchi or rales  Cor regular rate and rhythm no murmurs no ectopy  Extremities had dependent rubor. Trace edema improved from previous  Motor strength full and equal upper and lower extremity's. Reflexes were normal  Patient had a difficult time standing on her own and needed support to maintain her balance   Random BG was normal 143    Assessment/Plan:  1. Confusion  Patient with acute onset of confusion, ataxia, slurred speech and some urinary incontinence. I suspect she may be drinking alcohol although she absolutely denies this today  It may be related to recent changes in her psychiatric medication symptoms could be compounded by the new  Vascular medication from her vascular surgeon  Given the abrupt onset however I cannot rule out CVA or other metabolic disorder    I am recommending ER evaluation  Her partner is willing to drive her there now. He declined ambulance transport      2. Balance problems      3. Psychiatric disorder      4. History of insomnia      5. History of alcohol abuse      6. Peripheral vascular disease of lower extremity (HCC)  *    7. Venous insufficiency      8. Essential hypertension        Patient Instructions   To ER now        Continue current therapy plan except for indicated above. Verbal and written instructions (see AVS) provided.  Patient expresses understanding of diagnosis and treatment plan. Follow-up Disposition: Not on [address] [place] [address].  Jeremy Azar MD

## 2018-05-07 ENCOUNTER — TELEPHONE (OUTPATIENT)
Dept: FAMILY MEDICINE CLINIC | Age: 73
End: 2018-05-07

## 2018-05-07 NOTE — TELEPHONE ENCOUNTER
Pt was seen in [facility] ED. She wants to report she didn't have a stroke. Her magnesium was low and her WBC was elevated. Please call her to discuss. Best number is . She did not make follow up visit because she will see Napoleon Mcgrath on May 9th.

## 2018-05-08 NOTE — TELEPHONE ENCOUNTER
I returned patient's call 5/7 afternoon. No answer. Message left on her machine. Return patient's call a second time 5/8 11:30 AM.  No answer.   Patient has follow-up in our office tomorrow

## 2018-05-09 ENCOUNTER — TELEPHONE (OUTPATIENT)
Dept: RHEUMATOLOGY | Age: 73
End: 2018-05-09

## 2018-05-09 ENCOUNTER — TELEPHONE (OUTPATIENT)
Dept: FAMILY MEDICINE CLINIC | Age: 73
End: 2018-05-09

## 2018-05-09 NOTE — TELEPHONE ENCOUNTER
Dominique, Care Coordinator for [place], is meeting with patient on 5.14.2018 @ 1:30 for a health risk assessment. Wants to know if Dr. Jun Maria or a nursel would like to join in on a conference call at 3:00 the same day to go over what she has assessed. Nichole Heard may be reached at [phone].

## 2018-05-09 NOTE — TELEPHONE ENCOUNTER
----- Message from Lazarus Ishihara sent at 5/8/2018  9:43 AM EDT -----  Regarding: /Telephone    NEW PT stated, Dr. Cristina Suarez, Orthopedic Surgeon [phone] office advised he spoke with Dr. Albert Shay last wk concerning scheduling an early appt date due to pt's \"Raynaud's Disease\" conflicting with pt's knee replacement scheduled for August.  Pt stated, she left a message yesterday requesting a call back. [facility] P/[phone].

## 2018-05-15 ENCOUNTER — OFFICE VISIT (OUTPATIENT)
Dept: FAMILY MEDICINE CLINIC | Age: 73
End: 2018-05-15

## 2018-05-15 VITALS
WEIGHT: 178.4 LBS | DIASTOLIC BLOOD PRESSURE: 60 MMHG | OXYGEN SATURATION: 96 % | TEMPERATURE: 97.7 F | BODY MASS INDEX: 28.67 KG/M2 | RESPIRATION RATE: 16 BRPM | HEIGHT: 66 IN | SYSTOLIC BLOOD PRESSURE: 90 MMHG | HEART RATE: 100 BPM

## 2018-05-15 DIAGNOSIS — R41.0 CONFUSION: ICD-10-CM

## 2018-05-15 DIAGNOSIS — I10 ESSENTIAL HYPERTENSION: Primary | ICD-10-CM

## 2018-05-15 DIAGNOSIS — E83.52 HYPERCALCEMIA: ICD-10-CM

## 2018-05-15 DIAGNOSIS — M43.10 DEGENERATIVE SPONDYLOLISTHESIS: ICD-10-CM

## 2018-05-15 DIAGNOSIS — I73.00 RAYNAUD'S DISEASE WITHOUT GANGRENE: ICD-10-CM

## 2018-05-15 DIAGNOSIS — J98.6 ELEVATED HEMIDIAPHRAGM: ICD-10-CM

## 2018-05-15 DIAGNOSIS — F10.11 HISTORY OF ALCOHOL ABUSE: ICD-10-CM

## 2018-05-15 DIAGNOSIS — I73.9 PERIPHERAL VASCULAR DISEASE OF LOWER EXTREMITY (HCC): ICD-10-CM

## 2018-05-15 DIAGNOSIS — F99 PSYCHIATRIC DISORDER: ICD-10-CM

## 2018-05-15 DIAGNOSIS — R26.89 BALANCE PROBLEMS: ICD-10-CM

## 2018-05-15 RX ORDER — ASPIRIN 81 MG/1
81 TABLET ORAL DAILY
COMMUNITY
Start: 2018-05-15

## 2018-05-15 RX ORDER — METHYLPHENIDATE HYDROCHLORIDE EXTENDED RELEASE 20 MG/1
20 TABLET ORAL
COMMUNITY
End: 2019-06-21

## 2018-05-15 NOTE — PROGRESS NOTES
Patient here for ER follow-up      Aparna Sampson is a 68 y.o. female who presents to the office today with the following:  Chief Complaint   Patient presents with    Results     imaging at [facility] [place] [Levofloxacin] Other (comments)     Muscle weakness    Percocet [Oxycodone-Acetaminophen] Other (comments)     hallucinate  halucinations      Statins-Hmg-Coa Reductase Inhibitors Other (comments)     Muscle weakness       Current Outpatient Prescriptions   Medication Sig    methylphenidate SR (RITALIN SR) 20 mg tablet Take 30 mg by mouth.  cholecalciferol (VITAMIN D3) 5,000 unit capsule Take 1 Cap by mouth daily.  progesterone (PROMETRIUM) 100 mg capsule Take 100 mg by mouth daily.  cycloSPORINE (RESTASIS) 0.05 % ophthalmic emulsion Administer 1 Drop to both eyes two (2) times a day.  hydrOXYzine pamoate (VISTARIL) 25 mg capsule Take 25 mg by mouth as needed for Itching.  torsemide (DEMADEX) 20 mg tablet TAKE ONE TABLET BY MOUTH AS NEEDED FOR LEG SWELLING ONCE OR TWICE WEEKLY    spironolactone (ALDACTONE) 50 mg tablet TAKE ONE TABLET BY MOUTH DAILY    rOPINIRole (REQUIP) 1 mg tablet TAKE 1/2 TABLET BY MOUTH 3 TIMES A DAY (Patient taking differently: two (2) times a day. TAKE 1/2 TABLET BY MOUTH 3 TIMES A DAY)    potassium chloride (K-DUR, KLOR-CON) 20 mEq tablet TAKE ONE TABLET BY MOUTH DAILY (Patient taking differently: TAKE 2 TABLET BY MOUTH DAILY)    lisinopril (PRINIVIL, ZESTRIL) 20 mg tablet TAKE ONE TABLET BY MOUTH DAILY    fenofibrate nanocrystallized (TRICOR) 145 mg tablet TAKE ONE TABLET BY MOUTH DAILY    tretinoin (RETIN-A) 0.1 % topical cream APPLY TO AFFECTED AREA EVERY NIGHT    triamcinolone acetonide (KENALOG) 0.1 % topical cream Apply  to affected area two (2) times daily as needed for Skin Irritation. use thin layer    gabapentin (NEURONTIN) 100 mg capsule Take  by mouth three (3) times daily.     ESTRING 2 mg (7.5 mcg /24 hour) vaginal ring Insert 2 mg into vagina.  ammonium lactate (LAC-HYDRIN) 12 % lotion rub in to affected area well twice daily    temazepam (RESTORIL) 15 mg capsule     methylphenidate HCl (RITALIN SR) 30 mg LA capsule     venlafaxine-SR (EFFEXOR XR) 150 mg capsule Take 1 Cap by mouth two (2) times a day. No current facility-administered medications for this visit. Past Medical History:   Diagnosis Date    Actinic keratosis     ADHD (attention deficit hyperactivity disorder)     Arthritis     Bipolar affective disorder ([facility] [address]     Dr. Dionne Hermosillo Colon cancer [facility])     polyps on scope 2/17    Depression     GERD (gastroesophageal reflux disease)     Insomnia     Osteoporosis     Dexa 9/6/16       Past Surgical History:   Procedure Laterality Date    HX BACK SURGERY      HX COLECTOMY  1982    HX GI      HX HEENT      HX MALIGNANT SKIN LESION EXCISION         History   Smoking Status    Former Smoker    Packs/day: 1.00    Quit date: 3/5/1970   Smokeless Tobacco    Never Used       Family History   Problem Relation Age of Onset    Cancer Father      melanoma, larynx    Cancer Mother     Hypertension Mother     Cancer Maternal Aunt      pancreatic    Cancer Maternal Grandmother      pancreatic         History of Present Illness:  Patient here for ER follow-up and discussion of several other medical issues    I saw patient on 1 May. She was complaining of unsteadiness on her feet. She states it began the night before. She was complaining that her muscles felt weak although she had good motor strength in the office. She was unable to stand by herself and needed assistance from her partner to get from 20 from the bathroom. Her partner noted that she seemed to be confused the day before. She was making no sense when she was speaking and her speech was garbled. She was incontinent of urine. There was no loss of consciousness.   Symptoms had improved by the time I saw her the next morning but she still needed a walker to help her ambulate. There were no focal neurologic complaints. I am my nurses felt that we smell alcohol on her breath although she adamantly denied drinking at that time    She was sent to the emergency room given her symptoms. She had an extensive workup there. Chemistry panel was essentially normal except minimal increase in her transaminases. Magnesium was borderline low. She had a slightly elevated white count. Chest x-ray was negative except for an elevated right hemidiaphragm. Cranial CAT scan revealed some age-related atrophy and microvascular ischemic changes. MRI of her lumbar spine revealed some spinal stenosis and degenerative disc changes. This was a known problem. Alcohol levels were not drawn    Patient returns to the office today. She is feeling better at this point    Regarding the above  History and findings:  Patient does have a history of alcohol abuse. Her partner indicates that she does drink to excess at times. I did note a bruise on her right eye at the last visit and her partner indicated she fell off a stool a couple of weeks ago when she was inebriated. She admits to me today she may have been drinking the night before I saw her last visit. I strongly recommend she avoid alcohol in light of all of her other medical issues. Patient does have a long history of psychiatry bipolar disease and ADD. She follows with psychiatry who is in the process of adjusting her medications. At the time I saw her last visit they were stopping her effects or in some of her symptoms may been withdraw from that. She told the emergency room doctor she thought she was having a bipolar crisis. She is now on Seroquel and Wellbutrin in addition to her Ritalin and as needed Vistaril. She is no longer on the Effexor or and not on Restoril    She does as noted have chronic degenerative disc disease in her back. She does have a lot of lower extremity discomfort. She does have an appointment tomorrow to follow with orthopedist was seen her for this before and to discuss options. Patient would very much like to see neurology for their thoughts in light of all the above. She feels that of late her memory has not been as good as it has been in the past.  Her handwriting is not as good as it used to be. She states \"my three-dimensional thinking \"is not as good as it was before. Her psychiatrist was concerned regarding her cranial CT findings. Regarding her microvascular ischemic changes. Patient was supposed to be taking baby aspirin admits she has not doing so and plans to do so. She does have hypertension and hyperlipidemia both of which are currently in good control. I suspect her above symptoms were due to a combination of alcohol, medication changes from psychiatry and lower extremity symptoms from her spinal stenosis. However in light of her memory complaints etc. we will have her  seen by neurology for their thoughts and recommendations    Other recent medical concerns: Patient does have chronic lower extremity dependent rubor. She does have a history of Raynaud's. She has been seen by several vascular surgeons. She has had venous studies which been normal.  She has had ABIs in the past which have been normal.  Most recent ABIs that I see in her records were in 2016 and normal.  Her previous vascular surgeons have not recommended any additional treatment. She just saw a new vascular surgeon. Again venous studies have been normal.  He is repeating the arterial studies later this week. He is referring her to rheumatology for the Raynaud's. That appointment is coming up in June. She does have some chronic lower extremity edema and rubor. She has pain in her lower extremity but is predominantly knees. I am ordering some additional rheumatologic studies today    Patient is due for routine GYN follow-up. She would like someone to manage her estrogen ring. She was scheduled to see my partner for Pap but is decided she is going to follow with gynecology at this point. She plans to set up her own appointment for this I stressed the importance. She is scheduled for a mammogram and a DEXA scan next week. Patient was in for routine physical and review of all other medical issues 4/12. Please refer to that note                    Review of Systems:      Review of systems negative except as noted above    Physical Exam:  Visit Vitals    BP 90/60 (BP 1 Location: Left arm, BP Patient Position: Sitting)    Pulse 100    Temp 97.7 °F (36.5 °C) (Oral)    Resp 16    Ht 5' 6\" (1.676 m)    Wt 178 lb 6.4 oz (80.9 kg)    SpO2 96%    BMI 28.79 kg/m2     Vitals:    05/15/18 1031   BP: 90/60   BP 1 Location: Left arm   BP Patient Position: Sitting   Pulse: 100   Resp: 16   Temp: 97.7 °F (36.5 °C)   TempSrc: Oral   SpO2: 96%   Weight: 178 lb 6.4 oz (80.9 kg)   Height: 5' 6\" (1.676 m)     Patient no acute distress. Vital signs as above. Afebrile. Blood pressure borderline low today. Somewhat labile. Overall is been in good control on her current regimen  Affect was appropriate today. Much improved from last visit. She was calm in the office. Good eye contact. No psychotic or suicidal ideations  Head was normocephalic  Neck no nodes no masses no bruits  Chest was clear no wheezes rhonchi or rales  Cor regular rate and rhythm no S3 no S4 no murmurs  Abdomen soft and nontender  Extremities again had dependent rubor but less than when I saw her last week. She had good capillary refill. Trace to 1+ edema bilaterally    Assessment/Plan:  1. Essential hypertension  Blood pressure historically in good control. I am checking some follow-up lab work from her ER visit today specifically LFTs magnesium WBC.   I want to see her back after her upcoming appointments  - METABOLIC PANEL, COMPREHENSIVE  - CBC WITH AUTOMATED DIFF  - MAGNESIUM    2. Balance problems  Again suspect combination of alcohol withdrawal from psychiatric medication and lower extremity symptoms from her spinal stenosis but in light of her CAT scan findings patient will have her seen by neurology for their thoughts and evaluation  - REFERRAL TO NEUROLOGY    3. Peripheral vascular disease of lower extremity ([address]  Patient has upcoming evaluation with vascular surgery for arterial studies  - MD HANDLG&/OR CONVEY OF SPEC FOR TR OFFICE TO LAB  - COLLECTION VENOUS BLOOD,VENIPUNCTURE    4. Raynaud's disease without gangrene  Patient will keep her planned follow-up with rheumatology. I am checking RA ELBA  - ELBA QL, W/REFLEX CASCADE  - RHEUMASSURE    5. Psychiatric disorder  Continues follow-up with psychiatry who is adjusting her medication    6. Confusion    - REFERRAL TO NEUROLOGY    7. History of alcohol abuse  Importance of alcohol avoidance stressed    8. Degenerative spondylolisthesis  Follow with the orthopedist    9. Elevated hemidiaphragm  We will check right upper quadrant ultrasound to rule out any hepatomegaly or other right upper quadrant pathology    Patient will otherwise continue her routine medication. I would like to see her back in 2 months which will be after she sees all the above providers. I will be informing her of her test results in the interim      Continue current therapy plan except for indicated above. Verbal and written instructions (see AVS) provided.  Patient expresses understanding of diagnosis and treatment plan. Follow-up Disposition:  Return in about 2 months (around 7/15/2018). Ninoska Astudillo MD

## 2018-05-15 NOTE — MR AVS SNAPSHOT
[facility] 
[phone] Patient: Rosendo Rosario MRN: [id number] CAU:[id number] Visit Information Date & Time Provider Department Dept. Phone Encounter #  
 5/15/2018 10:10 AM Manjit Rosas MD [address] [place] [address] [phone] [id number] Follow-up Instructions Return in about 2 months (around 7/15/2018). Your Appointments 6/28/2018 10:40 AM  
Any with Manjit Rosas MD  
[address] [place] [address]) Appt Note: 5 mo f/u,CP$0/9.26.217; 5 mo f/u,CP$0/9.26.217  
 [address] [facility] [address] [id number] [phone]  
  
   
 [address] [address] Upcoming Health Maintenance Date Due  
 BREAST CANCER SCRN MAMMOGRAM 9/6/2018 MEDICARE YEARLY EXAM 6/28/2018 Influenza Age 5 to Adult 8/1/2018 GLAUCOMA SCREENING Q2Y 2/1/2019 COLONOSCOPY 2/16/2020 DTaP/Tdap/Td series (2 - Td) 2/18/2025 Allergies as of 5/15/2018  Review Complete On: 5/15/2018 By: Wolm Litten, LPN Severity Noted Reaction Type Reactions Levaquin [Levofloxacin]  06/10/2016    Other (comments) Muscle weakness Percocet [Oxycodone-acetaminophen]  05/09/2016    Other (comments)  
 hallucinate 
halucinations Statins-hmg-coa Reductase Inhibitors  06/10/2016    Other (comments) Muscle weakness Current Immunizations  Reviewed on 3/5/2014 Name Date Influenza High Dose Vaccine PF 9/26/2017 Influenza Vaccine 9/29/2015, 10/10/2013 Influenza Vaccine (Quad) 10/8/2014  4:53 PM  
 Influenza Vaccine (Quad) PF 10/27/2015 Pneumococcal Conjugate (PCV-13) 3/8/2016 Pneumococcal Polysaccharide (PPSV-23) 10/1/2014 Td 10/10/2013 Tdap 2/18/2015 Not reviewed this visit You Were Diagnosed With   
  
 Codes Comments Essential hypertension    -  Primary ICD-10-CM: I10 
ICD-9-CM: 401.9 Balance problems     ICD-10-CM: R26.89 
ICD-9-CM: 781.99 Peripheral vascular disease of lower extremity (HCC)     ICD-10-CM: I73.9 ICD-9-CM: 443.9 Raynaud's disease without gangrene     ICD-10-CM: I73.00 ICD-9-CM: 443.0 Psychiatric disorder     ICD-10-CM: F99 
ICD-9-CM: 300.9 Confusion     ICD-10-CM: R41.0 ICD-9-CM: 298.9 History of alcohol abuse     ICD-10-CM: Z87.898 ICD-9-CM: 305.03 Degenerative spondylolisthesis     ICD-10-CM: M43.10 ICD-9-CM: 738.4 Vitals BP Pulse Temp Resp Height(growth percentile) Weight(growth percentile) 90/60 (BP 1 Location: Left arm, BP Patient Position: Sitting) 100 97.7 °F (36.5 °C) (Oral) 16 5' 6\" (1.676 m) 178 lb 6.4 oz (80.9 kg) SpO2 BMI OB Status Smoking Status 96% 28.79 kg/m2 Postmenopausal Former Smoker Vitals History BMI and BSA Data Body Mass Index Body Surface Area 28.79 kg/m 2 1.94 m 2 Preferred Pharmacy Pharmacy Name Phone [facility],[address], [address] [place] [address]  [phone] Your Updated Medication List  
  
   
This list is accurate as of 5/15/18 11:37 AM.  Always use your most recent med list.  
  
  
  
  
 ammonium lactate 12 % lotion Commonly known as:  LAC-HYDRIN  
rub in to affected area well twice daily  
  
 cholecalciferol 5,000 unit capsule Commonly known as:  VITAMIN D3 Take 1 Cap by mouth daily. ESTRING 2 mg (7.5 mcg /24 hour) vaginal ring Generic drug:  estradiol Insert 2 mg into vagina. fenofibrate nanocrystallized 145 mg tablet Commonly known as:  TRICOR  
TAKE ONE TABLET BY MOUTH DAILY  
  
 gabapentin 100 mg capsule Commonly known as:  NEURONTIN Take  by mouth three (3) times daily. lisinopril 20 mg tablet Commonly known as:  PRINIVIL, ZESTRIL  
TAKE ONE TABLET BY MOUTH DAILY * methylphenidate SR 20 mg tablet Commonly known as:  RITALIN SR Take 30 mg by mouth. * methylphenidate HCl 30 mg LA capsule Commonly known as:  RITALIN SR  
  
 potassium chloride 20 mEq tablet Commonly known as:  K-DUR, KLOR-CON  
TAKE ONE TABLET BY MOUTH DAILY  
  
 progesterone 100 mg capsule Commonly known as:  PROMETRIUM Take 100 mg by mouth daily. RESTASIS 0.05 % ophthalmic emulsion Generic drug:  cycloSPORINE Administer 1 Drop to both eyes two (2) times a day. rOPINIRole 1 mg tablet Commonly known as:  REQUIP  
TAKE 1/2 TABLET BY MOUTH 3 TIMES A DAY  
  
 spironolactone 50 mg tablet Commonly known as:  ALDACTONE  
TAKE ONE TABLET BY MOUTH DAILY  
  
 temazepam 15 mg capsule Commonly known as:  RESTORIL  
  
 torsemide 20 mg tablet Commonly known as:  DEMADEX TAKE ONE TABLET BY MOUTH AS NEEDED FOR LEG SWELLING ONCE OR TWICE WEEKLY  
  
 tretinoin 0.1 % topical cream  
Commonly known as:  RETIN-A  
APPLY TO AFFECTED AREA EVERY NIGHT  
  
 triamcinolone acetonide 0.1 % topical cream  
Commonly known as:  KENALOG Apply  to affected area two (2) times daily as needed for Skin Irritation. use thin layer  
  
 venlafaxine- mg capsule Commonly known as:  EFFEXOR XR Take 1 Cap by mouth two (2) times a day. VISTARIL 25 mg capsule Generic drug:  hydrOXYzine pamoate Take 25 mg by mouth as needed for Itching. * Notice: This list has 2 medication(s) that are the same as other medications prescribed for you. Read the directions carefully, and ask your doctor or other care provider to review them with you. We Performed the Following ELBA QL, W/REFLEX CASCADE [[id number] Custom] CBC WITH AUTOMATED DIFF [46068 CPT(R)] COLLECTION VENOUS BLOOD,VENIPUNCTURE I2143658 CPT(R)] MAGNESIUM [id number] CPT(R)] METABOLIC PANEL, COMPREHENSIVE [24480 CPT(R)] [place] HANDLG&/OR CONVEY OF SPEC FOR TR OFFICE TO LAB [89722 CPT(R)] REFERRAL TO NEUROLOGY [IEH57 Custom] Comments:  
 Evaluate complaints of decreasing memory, difficulty writing, balance problems Cranial CT showing microvascular ischemic changes Patient requests neurology through [facility] Radha Belcher [[id number] Custom] Follow-up Instructions Return in about 2 months (around 7/15/2018). Referral Information Referral ID Referred By Referred To  
  
 [id number] Gregorio Mar MD   
   [address] [employer] [address] [place], [facility] Shimon Hernandez Phone: [phone] Fax: [phone] Visits Status Start Date End Date 1 Authorized 5/15/18 5/15/19 If your referral has a status of pending review or denied, additional information will be sent to support the outcome of this decision. Patient Instructions Continue current medications Recommend baby aspirin daily Keep follow-up with psychiatry, orthopedist, rheumatology Referring you to neurology Patient to schedule her follow-up with gynecology Avoid alcohol Keep planned follow-up 2 months sooner if needed Introducing [facility] [place] [facility] SERVICES! [employer] introduces [employer] patient portal. Now you can access parts of your medical record, email your doctor's office, and request medication refills online. 1. In your internet browser, go to [email or web address] 2. Click on the First Time User? Click Here link in the Sign In box. You will see the New Member Sign Up page. 3. Enter your [employer] Access Code exactly as it appears below. You will not need to use this code after youve completed the sign-up process. If you do not sign up before the expiration date, you must request a new code. · [employer] Access Code: [email or web address] Expires: 5/27/2018  1:46 PM 
 
4. Enter the last four digits of your Social Security Number (xxxx) and Date of Birth (mm/dd/yyyy) as indicated and click Submit. You will be taken to the next sign-up page. 5. Create a [employer] ID.  This will be your [employer] login ID and cannot be changed, so think of one that is secure and easy to remember. 6. Create a [employer] password. You can change your password at any time. 7. Enter your Password Reset Question and Answer. This can be used at a later time if you forget your password. 8. Enter your e-mail address. You will receive e-mail notification when new information is available in [address] 9. Click Sign Up. You can now view and download portions of your medical record. 10. Click the Download Summary menu link to download a portable copy of your medical information. If you have questions, please visit the Frequently Asked Questions section of the [employer] website. Remember, [employer] is NOT to be used for urgent needs. For medical emergencies, dial 911. Now available from your iPhone and Android! Please provide this summary of care documentation to your next provider. Your primary care clinician is listed as Katrin Treadwell. If you have any questions after today's visit, please call [phone].

## 2018-05-15 NOTE — PATIENT INSTRUCTIONS
Continue current medications  Recommend baby aspirin daily  Keep follow-up with psychiatry, orthopedist, rheumatology  Referring you to neurology  Patient to schedule her follow-up with gynecology  Avoid alcohol  Keep planned follow-up 2 months sooner if needed

## 2018-05-16 ENCOUNTER — TELEPHONE (OUTPATIENT)
Dept: FAMILY MEDICINE CLINIC | Age: 73
End: 2018-05-16

## 2018-05-16 NOTE — TELEPHONE ENCOUNTER
Dede from [employer] for Part B coverage called about a prior Auth. For this pt. Javier Cain wanted to know if a nurse could call her back with a diagnosis.  Call Javier Cain at [phone]

## 2018-05-16 NOTE — TELEPHONE ENCOUNTER
Spoke w/Dede from [facility]; needed PA diagnosis for tretinoin (RETIN-A) 0.1 % topical cream; per medication list in pt.  Chart, dx:

## 2018-05-21 ENCOUNTER — TELEPHONE (OUTPATIENT)
Dept: FAMILY MEDICINE CLINIC | Age: 73
End: 2018-05-21

## 2018-05-21 NOTE — TELEPHONE ENCOUNTER
I have called and talked to this patient, advised her of Dr Margie Sánchez response to her questions about the ABD US. Patient verbalizes understanding.

## 2018-05-22 ENCOUNTER — TELEPHONE (OUTPATIENT)
Dept: FAMILY MEDICINE CLINIC | Age: 73
End: 2018-05-22

## 2018-05-23 ENCOUNTER — TELEPHONE (OUTPATIENT)
Dept: FAMILY MEDICINE CLINIC | Age: 73
End: 2018-05-23

## 2018-05-23 NOTE — TELEPHONE ENCOUNTER
I have called and talked to this patient, she reports that she also got a letter from "[employer] about this medication, she contacted her dermatologist (Dr. Christofer Guardado) office and they got the medication approved for her.

## 2018-05-23 NOTE — TELEPHONE ENCOUNTER
Received records from  [facility]  Patient had normal arterial Doppler study lower extremities  Ordered by her vascular surgeon

## 2018-05-24 LAB
14.3.3 ETA, RHEUM. ARTHRITIS: <0.2 NG/ML
ALBUMIN SERPL-MCNC: 4.3 G/DL (ref 3.5–4.8)
ALBUMIN/GLOB SERPL: 1.6 {RATIO} (ref 1.2–2.2)
ALP SERPL-CCNC: 51 IU/L (ref 39–117)
ALT SERPL-CCNC: 26 IU/L (ref 0–32)
ANA SER QL: NEGATIVE
AST SERPL-CCNC: 30 IU/L (ref 0–40)
BASOPHILS # BLD AUTO: 0.1 X10E3/UL (ref 0–0.2)
BASOPHILS NFR BLD AUTO: 0 %
BILIRUB SERPL-MCNC: 0.7 MG/DL (ref 0–1.2)
BUN SERPL-MCNC: 30 MG/DL (ref 8–27)
BUN/CREAT SERPL: 33 (ref 12–28)
CALCIUM SERPL-MCNC: 12.3 MG/DL (ref 8.7–10.3)
CCP IGA+IGG SERPL IA-ACNC: <20 UNITS
CHLORIDE SERPL-SCNC: 92 MMOL/L (ref 96–106)
CO2 SERPL-SCNC: 25 MMOL/L (ref 18–29)
CREAT SERPL-MCNC: 0.9 MG/DL (ref 0.57–1)
EOSINOPHIL # BLD AUTO: 0.1 X10E3/UL (ref 0–0.4)
EOSINOPHIL NFR BLD AUTO: 1 %
ERYTHROCYTE [DISTWIDTH] IN BLOOD BY AUTOMATED COUNT: 14.4 % (ref 12.3–15.4)
GFR SERPLBLD CREATININE-BSD FMLA CKD-EPI: 64 ML/MIN/1.73
GFR SERPLBLD CREATININE-BSD FMLA CKD-EPI: 73 ML/MIN/1.73
GLOBULIN SER CALC-MCNC: 2.7 G/DL (ref 1.5–4.5)
GLUCOSE SERPL-MCNC: 96 MG/DL (ref 65–99)
HCT VFR BLD AUTO: 42 % (ref 34–46.6)
HGB BLD-MCNC: 14.1 G/DL (ref 11.1–15.9)
IMM GRANULOCYTES # BLD: 0.1 X10E3/UL (ref 0–0.1)
IMM GRANULOCYTES NFR BLD: 1 %
LYMPHOCYTES # BLD AUTO: 2.8 X10E3/UL (ref 0.7–3.1)
LYMPHOCYTES NFR BLD AUTO: 22 %
MAGNESIUM SERPL-MCNC: 1.7 MG/DL (ref 1.6–2.3)
MCH RBC QN AUTO: 32.1 PG (ref 26.6–33)
MCHC RBC AUTO-ENTMCNC: 33.6 G/DL (ref 31.5–35.7)
MCV RBC AUTO: 96 FL (ref 79–97)
MONOCYTES # BLD AUTO: 1.1 X10E3/UL (ref 0.1–0.9)
MONOCYTES NFR BLD AUTO: 9 %
NEUTROPHILS # BLD AUTO: 8.7 X10E3/UL (ref 1.4–7)
NEUTROPHILS NFR BLD AUTO: 67 %
PLATELET # BLD AUTO: 290 X10E3/UL (ref 150–379)
POTASSIUM SERPL-SCNC: 4.5 MMOL/L (ref 3.5–5.2)
PROT SERPL-MCNC: 7 G/DL (ref 6–8.5)
RBC # BLD AUTO: 4.39 X10E6/UL (ref 3.77–5.28)
RHEUMATOID FACT SERPL-ACNC: <14 UNITS/ML
SEE BELOW:, 164879: NORMAL
SODIUM SERPL-SCNC: 134 MMOL/L (ref 134–144)
WBC # BLD AUTO: 12.9 X10E3/UL (ref 3.4–10.8)

## 2018-05-24 NOTE — PROGRESS NOTES
Labs reviewed please notify patient  CBC reveals white count mildly elevated but improved from previous emergency room visit  Magnesium level now normal  All rheumatologic tests rheumatoid arthritis titer, ELBA normal.  Patient should still keep plan follow-up with rheumatology  Calcium a bit higher than previous.   Likely secondary to her Spironolactone use but I would like patient to come back in additional nonfasting lab work  PTH/ionized calcium/phosphorus /vitamin D  Lab order in chart  She should increase her fluid intake  We may need to consider adjusting her  medications if her calcium remains at this level and the other tests are  normal

## 2018-05-25 NOTE — PROGRESS NOTES
Notified of lab results and patient due to come in next week for PTH/ionized calcium/phosphorus blood test.

## 2018-06-04 ENCOUNTER — TELEPHONE (OUTPATIENT)
Dept: FAMILY MEDICINE CLINIC | Age: 73
End: 2018-06-04

## 2018-06-04 RX ORDER — FLUCONAZOLE 150 MG/1
150 TABLET ORAL DAILY
Qty: 1 TAB | Refills: 0 | Status: SHIPPED | OUTPATIENT
Start: 2018-06-04 | End: 2018-06-05 | Stop reason: ALTCHOICE

## 2018-06-04 NOTE — TELEPHONE ENCOUNTER
Pt has f/u appointment with you tomorrow, however, she was prescribed antibiotics in Hospital and now has yeast inf. She would like diflucan sent to [facility] today. She says she can't wait until tomorrow.     Best number is

## 2018-06-05 ENCOUNTER — OFFICE VISIT (OUTPATIENT)
Dept: FAMILY MEDICINE CLINIC | Age: 73
End: 2018-06-05

## 2018-06-05 VITALS
SYSTOLIC BLOOD PRESSURE: 136 MMHG | HEART RATE: 98 BPM | TEMPERATURE: 99.2 F | DIASTOLIC BLOOD PRESSURE: 84 MMHG | RESPIRATION RATE: 18 BRPM | OXYGEN SATURATION: 96 % | HEIGHT: 66 IN | WEIGHT: 183 LBS | BODY MASS INDEX: 29.41 KG/M2

## 2018-06-05 DIAGNOSIS — E83.52 HYPERCALCEMIA: ICD-10-CM

## 2018-06-05 DIAGNOSIS — I87.2 VENOUS INSUFFICIENCY: Primary | ICD-10-CM

## 2018-06-05 DIAGNOSIS — L03.115 CELLULITIS OF RIGHT LOWER EXTREMITY: ICD-10-CM

## 2018-06-05 DIAGNOSIS — M79.89 RIGHT LEG SWELLING: ICD-10-CM

## 2018-06-05 DIAGNOSIS — I10 ESSENTIAL HYPERTENSION: ICD-10-CM

## 2018-06-05 RX ORDER — TORSEMIDE 20 MG/1
20 TABLET ORAL DAILY
Qty: 30 TAB | Refills: 1 | Status: SHIPPED | OUTPATIENT
Start: 2018-06-05 | End: 2018-10-03 | Stop reason: SDUPTHER

## 2018-06-05 RX ORDER — CEFTRIAXONE SODIUM 2 G/1
2 INJECTION, POWDER, FOR SOLUTION INTRAVENOUS EVERY 24 HOURS
COMMUNITY
End: 2018-06-27 | Stop reason: ALTCHOICE

## 2018-06-05 RX ORDER — BUPROPION HYDROCHLORIDE 150 MG/1
150 TABLET ORAL
COMMUNITY
End: 2018-12-07 | Stop reason: SINTOL

## 2018-06-05 NOTE — PATIENT INSTRUCTIONS
Continue current medications  May restart requip  Take torsemide daily    Work on diet and exercise    Lab work today    Keep planned follow up visit next week

## 2018-06-05 NOTE — PROGRESS NOTES
Dayan Ryder is a 68 y.o. female who presents to the office today with the following:  Chief Complaint   Patient presents with   [facility] Follow Up       Allergies   Allergen Reactions    Levaquin [Levofloxacin] Other (comments)     Muscle weakness    Percocet [Oxycodone-Acetaminophen] Other (comments)     hallucinate  halucinations      Statins-Hmg-Coa Reductase Inhibitors Other (comments)     Muscle weakness       Current Outpatient Prescriptions   Medication Sig    buPROPion XL (WELLBUTRIN XL) 150 mg tablet Take 150 mg by mouth.  cefTRIAXone (ROCEPHIN) 2 gram solr 2 g by IntraVENous route every twenty-four (24) hours.  torsemide (DEMADEX) 20 mg tablet Take 1 Tab by mouth daily.  aspirin delayed-release 81 mg tablet Take 1 Tab by mouth daily.  methylphenidate HCl (RITALIN SR) 30 mg LA capsule     cholecalciferol (VITAMIN D3) 5,000 unit capsule Take 1 Cap by mouth daily.  progesterone (PROMETRIUM) 100 mg capsule Take 100 mg by mouth daily.  cycloSPORINE (RESTASIS) 0.05 % ophthalmic emulsion Administer 1 Drop to both eyes two (2) times a day.  hydrOXYzine pamoate (VISTARIL) 25 mg capsule Take 25 mg by mouth as needed for Itching.  rOPINIRole (REQUIP) 1 mg tablet TAKE 1/2 TABLET BY MOUTH 3 TIMES A DAY (Patient taking differently: two (2) times a day. TAKE 1/2 TABLET BY MOUTH 3 TIMES A DAY)    fenofibrate nanocrystallized (TRICOR) 145 mg tablet TAKE ONE TABLET BY MOUTH DAILY    tretinoin (RETIN-A) 0.1 % topical cream APPLY TO AFFECTED AREA EVERY NIGHT    gabapentin (NEURONTIN) 100 mg capsule Take 300 mg by mouth two (2) times a day.  ESTRING 2 mg (7.5 mcg /24 hour) vaginal ring Insert 2 mg into vagina.  ammonium lactate (LAC-HYDRIN) 12 % lotion rub in to affected area well twice daily    methylphenidate SR (RITALIN SR) 20 mg tablet Take 30 mg by mouth.     temazepam (RESTORIL) 15 mg capsule     triamcinolone acetonide (KENALOG) 0.1 % topical cream Apply  to affected area two (2) times daily as needed for Skin Irritation. use thin layer     No current facility-administered medications for this visit. Past Medical History:   Diagnosis Date    Actinic keratosis     ADHD (attention deficit hyperactivity disorder)     Arthritis     B[facility]     Dr[facility] Gabriel Keating Colon [facility])     polyps on scope 2/17    Depression     GERD (gastroesophageal reflux disease)     Insomnia     Osteoporosis     Dexa 9/6/16       Past Surgical History:   Procedure Laterality Date    HX BACK SURGERY      HX COLECTOMY  1982    HX GI      HX HEENT      HX MALIGNANT SKIN LESION EXCISION         History   Smoking Status    Former Smoker    Packs/day: 1.00    Quit date: 3/5/1970   Smokeless Tobacco    Never Used       Family History   Problem Relation Age of Onset    Cancer Father      melanoma, larynx    Cancer Mother     Hypertension Mother     Cancer Maternal Aunt      pancreatic    Cancer Maternal Grandmother      pancreatic         History of Present Illness:  Patient here for hospital follow-up and follow-up of several other medical issues    I did see patient mid-May for an ER follow-up. She was in for routine physical 4/12 for review of all her medical problems    After I saw the patient last visit she stumbled and banged her right shin on a piece of metal.  She developed an infected sore and then cellulitis of her right lower extremity. She became acutely ill. Went to the hospital was diagnosed with gram-negative septicemia secondary to her infected leg. X-rays and ultrasound of the leg were essentially normal.  She was treated with IV antibiotics in the hospital and responded well. She is currently on IV Rocephin daily until 6/10 through a PICC line right upper extremity. She is tolerating the treatment well and her leg is responding nicely. Patient does have hypertension. While in the hospital she was hypotensive.   They did hold her lisinopril Spironolactone and potassium. Blood pressures remain in the normal range today. Her renal function was normal while in the hospital.  We will continue her current medications but we may need to restart the lisinopril in the future. Patient does have some mild hypercalcemia. Previous PTH levels have been normal.  Previous vitamin D levels have been normal.  I suspected that her hypercalcemia was due to her Spironolactone. I have asked the patient to come back in for repeat calcium ionized calcium PTH levels. I was planning on stopping her spironolactone if her calcium remains elevated. As noted she is now off the spironolactone. Her calcium actually improved while in the hospital was normal at discharge. I am repeating that today. Patient does have chronic lower extremity edema and dependent rubor. She again has been following with vascular surgery. She seen several.  She has had some follow-up venous studies which were felt to be normal.  Follow-up arterial studies were also normal.  She tells me her vascular surgeon told her that she has chronic edema but they cannot exactly tell her why. She was on as needed torsemide twice weekly. Now that she is no longer on the spironolactone I recommended she take this on a daily basis as she has had some increasing edema and weight gain since leaving the hospital.  Checking lab work today. Patient does have a history of Raynaud's with some dependent rubor. No upper extremity symptoms. Since last visit rheumatologic studies ELBA and rheumatoid tests both negative. She does have an appointment to see rheumatology later on this summer. She does get routine GYN exam pelvic exams. She has a rescheduled with a new gynecologist and that appointment is coming up later on the summer. She is also rescheduled her DEXA scan and mammogram for next week. She does have restless leg syndrome. She gets good relief from Requip.   This was held while she was in the hospital.  She admits she has been taking it occasionally since coming home from the hospital and would like to continue with this. Patient has requested a referral to neurology because she feels her memory has not been as good of late. Please refer to the note from her last visit. She has spoken with her psychologist who recommended a neurologist that she plans to see later on the summer. Patient does continue to follow with psychiatry for bipolar disease and ADD. They have been making some changes in her medication and her current med list reflex her new medical regimen. Review of Systems:    Review of systems negative except as noted above patient in no acute distress. Vital signs as above. Afebrile. Physical Exam:  Visit Vitals    /84 (BP 1 Location: Left arm, BP Patient Position: Sitting)    Pulse 98    Temp 99.2 °F (37.3 °C) (Oral)    Resp 18    Ht 5' 6\" (1.676 m)    Wt 183 lb (83 kg)    SpO2 96%    BMI 29.54 kg/m2     Vitals:    06/05/18 1624   BP: 136/84   BP 1 Location: Left arm   BP Patient Position: Sitting   Pulse: 98   Resp: 18   Temp: 99.2 °F (37.3 °C)   TempSrc: Oral   SpO2: 96%   Weight: 183 lb (83 kg)   Height: 5' 6\" (1.676 m)     Patient no acute distress. Vital signs as above. Blood pressure in good control  She looked quite good today. Alert interactive happy affect was normal  Head was normocephalic  Neck had no masses no JVD  Chest was clear no wheezes rhonchi or rales  Cor regular rate and rhythm no S3-S4  Right upper extremity PICC line site looked good. No erythema no induration  Lower extremities did have bilateral pitting edema to the knees. Healed abrasion right shin. There was no erythema of the lower extremity's. Nontender    Assessment/Plan:  1. Cellulitis of right lower extremity  Improving with IV Rocephin. We will continue to the remainder of the course of therapy to and 6/10.   I do plan to see the patient back next week for recheck and I will remove the PICC line at that time    2. Venous insufficiency  Spironolactone currently on hold. Will increase torsemide to daily. Support hose would help but in light of her Raynaud's she has been advised to avoid this. She will keep her planned follow-up with vascular surgery and pathology. I did remind her to take daily aspirin  - torsemide (DEMADEX) 20 mg tablet; Take 1 Tab by mouth daily. Dispense: 30 Tab; Refill: 1    3. Essential hypertension  Controlled on her current regiment with lisinopril on hold. We may need to restart this in the future  - METABOLIC PANEL, BASIC    4. Hypercalcemia  Checking lab work today  - PARATHYROID HORMONE, INTACT + CALCIUM  - CALCIUM, IONIZED    5. Right leg swelling    - torsemide (DEMADEX) 20 mg tablet; Take 1 Tab by mouth daily. Dispense: 30 Tab; Refill: 1    Patient Instructions   Continue current medications  May restart requip  Take torsemide daily    Work on diet and exercise    Lab work today    Keep planned follow up visit next week          Continue current therapy plan except for indicated above. Verbal and written instructions (see AVS) provided.  Patient expresses understanding of diagnosis and treatment plan. Follow-up Disposition:  Return in about 1 week (around 6/12/2018). Avel Lundberg.  Ernst Muir MD

## 2018-06-05 NOTE — MR AVS SNAPSHOT
[facility] [address] 
[phone] Patient: Johan Rico MRN: [id number] MLP:[birth year]7 Visit Information Date & Time Provider Department Dept. Phone Encounter #  
 6/5/2018  4:00 PM Dariel oDlan MD [address] [place] [address] [phone] [id number] Follow-up Instructions Return in about 1 week (around 6/12/2018). Follow-up and Disposition History Your Appointments 6/12/2018 10:10 AM  
Any with Dariel Dolan MD  
[address] [place] [address]) Appt Note: remove pic line $0 CP mbm  
 [address]  
[phone]  
  
   
 [address] [address]  
  
    
 6/28/2018 10:40 AM  
Any with Dariel Dolan MD  
[address] [place] [address]) Appt Note: 5 mo f/u,CP$0/9.26.217; 5 mo f/u,CP$0/9.26.217  
 [address]  
[phone]  
  
    
 7/16/2018 10:50 AM  
Any with Dariel Dolan MD  
[address] [place] [address]) Appt Note: 2 month f.up cp$0 5/15/18 hme  
 [address]  
[phone] Upcoming Health Maintenance Date Due  
 BREAST CANCER SCRN MAMMOGRAM 9/6/2018 MEDICARE YEARLY EXAM 6/28/2018 Influenza Age 5 to Adult 8/1/2018 GLAUCOMA SCREENING Q2Y 2/1/2019 COLONOSCOPY 2/16/2020 DTaP/Tdap/Td series (2 - Td) 2/18/2025 Allergies as of 6/5/2018  Review Complete On: 6/5/2018 By: Dariel Dolan MD  
  
 Severity Noted Reaction Type Reactions Levaquin [Levofloxacin]  06/10/2016    Other (comments) Muscle weakness Percocet [Oxycodone-acetaminophen]  05/09/2016    Other (comments)  
 hallucinate 
halucinations Statins-hmg-coa Reductase Inhibitors  06/10/2016    Other (comments) Muscle weakness Current Immunizations  Reviewed on 3/5/2014 Name Date Influenza High Dose Vaccine PF 9/26/2017 Influenza Vaccine 9/29/2015, 10/10/2013 Influenza Vaccine (Quad) 10/8/2014  4:53 PM  
 Influenza Vaccine (Quad) PF 10/27/2015 Pneumococcal Conjugate (PCV-13) 3/8/2016 Pneumococcal Polysaccharide (PPSV-23) 10/1/2014 Td 10/10/2013 Tdap 2/18/2015 Not reviewed this visit You Were Diagnosed With   
  
 Codes Comments Venous insufficiency    -  Primary ICD-10-CM: M98.4 ICD-9-CM: 459.81 Cellulitis of right lower extremity     ICD-10-CM: L03.115 ICD-9-CM: 682.6 Essential hypertension     ICD-10-CM: I10 
ICD-9-CM: 401.9 Hypercalcemia     ICD-10-CM: A34.87 
ICD-9-CM: 275.42 Right leg swelling     ICD-10-CM: M79.89 ICD-9-CM: 729.81 Vitals BP Pulse Temp Resp Height(growth percentile) Weight(growth percentile) 136/84 (BP 1 Location: Left arm, BP Patient Position: Sitting) 98 99.2 °F (37.3 °C) (Oral) 18 5' 6\" (1.676 m) 183 lb (83 kg) SpO2 BMI OB Status Smoking Status 96% 29.54 kg/m2 Postmenopausal Former Smoker Vitals History BMI and BSA Data Body Mass Index Body Surface Area  
 29.54 kg/m 2 1.97 m 2 Preferred Pharmacy Pharmacy Name Phone [facility],[address], [address] [place] [address]  [phone] Your Updated Medication List  
  
   
This list is accurate as of 6/5/18  5:27 PM.  Always use your most recent med list.  
  
  
  
  
 ammonium lactate 12 % lotion Commonly known as:  LAC-HYDRIN  
rub in to affected area well twice daily  
  
 aspirin delayed-release 81 mg tablet Take 1 Tab by mouth daily. buPROPion  mg tablet Commonly known as:  Jeraline Lalit Take 150 mg by mouth. cefTRIAXone 2 gram Solr Commonly known as:  ROCEPHIN  
2 g by IntraVENous route every twenty-four (24) hours. cholecalciferol 5,000 unit capsule Commonly known as:  VITAMIN D3 Take 1 Cap by mouth daily. ESTRING 2 mg (7.5 mcg /24 hour) vaginal ring Generic drug:  estradiol Insert 2 mg into vagina. fenofibrate nanocrystallized 145 mg tablet Commonly known as:  TRICOR  
TAKE ONE TABLET BY MOUTH DAILY  
  
 gabapentin 100 mg capsule Commonly known as:  NEURONTIN Take 300 mg by mouth two (2) times a day. * methylphenidate SR 20 mg tablet Commonly known as:  RITALIN SR Take 30 mg by mouth. * methylphenidate HCl 30 mg LA capsule Commonly known as:  RITALIN SR  
  
 progesterone 100 mg capsule Commonly known as:  PROMETRIUM Take 100 mg by mouth daily. RESTASIS 0.05 % ophthalmic emulsion Generic drug:  cycloSPORINE Administer 1 Drop to both eyes two (2) times a day. rOPINIRole 1 mg tablet Commonly known as:  REQUIP  
TAKE 1/2 TABLET BY MOUTH 3 TIMES A DAY  
  
 temazepam 15 mg capsule Commonly known as:  RESTORIL  
  
 torsemide 20 mg tablet Commonly known as:  DEMADEX Take 1 Tab by mouth daily. tretinoin 0.1 % topical cream  
Commonly known as:  RETIN-A  
APPLY TO AFFECTED AREA EVERY NIGHT  
  
 triamcinolone acetonide 0.1 % topical cream  
Commonly known as:  KENALOG Apply  to affected area two (2) times daily as needed for Skin Irritation. use thin layer VISTARIL 25 mg capsule Generic drug:  hydrOXYzine pamoate Take 25 mg by mouth as needed for Itching. * Notice: This list has 2 medication(s) that are the same as other medications prescribed for you. Read the directions carefully, and ask your doctor or other care provider to review them with you. Prescriptions Sent to Pharmacy Refills  
 torsemide (DEMADEX) 20 mg tablet 1 Sig: Take 1 Tab by mouth daily. Class: Normal  
 Pharmacy: [address] [place] [address],[address]  Ph #: [phone] Route: Oral  
  
We Performed the Following CALCIUM, IONIZED M7550061 CPT(R)] COLLECTION VENOUS BLOOD,VENIPUNCTURE [id number] CPT(R)] METABOLIC PANEL, BASIC [28613 CPT(R)] PARATHYROID HORMONE, INTACT + CALCIUM [71384 CPT(R)] ID HANDLG&/OR CONVEY OF SPEC FOR TR OFFICE TO LAB [17663 CPT(R)] VITAMIN D, 25 HYDROXY [id number] CPT(R)] Follow-up Instructions Return in about 1 week (around 6/12/2018). Patient Instructions Continue current medications May restart requip Take torsemide daily Work on diet and exercise Lab work today Keep planned follow up visit next week Introducing hospitals & HEALTH SERVICES! [facility] introduces [employer] patient portal. Now you can access parts of your medical record, email your doctor's office, and request medication refills online. 1. In your internet browser, go to [email or web address] [email or web address] 2. Click on the First Time User? Click Here link in the Sign In box. You will see the New Member Sign Up page. 3. Enter your [employer] Access Code exactly as it appears below. You will not need to use this code after youve completed the sign-up process. If you do not sign up before the expiration date, you must request a new code. · [employer] Access Code: [email or web address] Expires: 9/3/2018  4:02 PM 
 
4. Enter the last four digits of your Social Security Number (xxxx) and Date of Birth (mm/dd/yyyy) as indicated and click Submit. You will be taken to the next sign-up page. 5. Create a [employer] ID. This will be your [employer] login ID and cannot be changed, so think of one that is secure and easy to remember. 6. Create a [employer] password. You can change your password at any time. 7. Enter your Password Reset Question and Answer. This can be used at a later time if you forget your password. 8. Enter your e-mail address. You will receive e-mail notification when new information is available in [address] 9. Click Sign Up. You can now view and download portions of your medical record.  
10. Click the Download Summary menu link to download a portable copy of your medical information. If you have questions, please visit the Frequently Asked Questions section of the [employer] website. Remember, [employer] is NOT to be used for urgent needs. For medical emergencies, dial 911. Now available from your iPhone and Android! Please provide this summary of care documentation to your next provider. Your primary care clinician is listed as Florencio Waldrop. If you have any questions after today's visit, please call [phone].

## 2018-06-07 LAB
25(OH)D3+25(OH)D2 SERPL-MCNC: 41.5 NG/ML (ref 30–100)
BUN SERPL-MCNC: 19 MG/DL (ref 8–27)
BUN/CREAT SERPL: 22 (ref 12–28)
CA-I SERPL ISE-MCNC: 5.8 MG/DL (ref 4.5–5.6)
CALCIUM SERPL-MCNC: 10.7 MG/DL (ref 8.7–10.3)
CHLORIDE SERPL-SCNC: 105 MMOL/L (ref 96–106)
CO2 SERPL-SCNC: 22 MMOL/L (ref 18–29)
CREAT SERPL-MCNC: 0.88 MG/DL (ref 0.57–1)
GFR SERPLBLD CREATININE-BSD FMLA CKD-EPI: 65 ML/MIN/1.73
GFR SERPLBLD CREATININE-BSD FMLA CKD-EPI: 75 ML/MIN/1.73
GLUCOSE SERPL-MCNC: 92 MG/DL (ref 65–99)
INTACT PTH, 004000: NORMAL
POTASSIUM SERPL-SCNC: 4.1 MMOL/L (ref 3.5–5.2)
PTH-INTACT SERPL-MCNC: 19 PG/ML (ref 15–65)
SODIUM SERPL-SCNC: 143 MMOL/L (ref 134–144)

## 2018-06-07 NOTE — PROGRESS NOTES
Calcium improved and approaching normal  PTH and vitamin D levels normal  Patient should continue her current medical regimen including holding the spironolactone as discussed  Keep planned follow-up

## 2018-06-08 ENCOUNTER — TELEPHONE (OUTPATIENT)
Dept: FAMILY MEDICINE CLINIC | Age: 73
End: 2018-06-08

## 2018-06-08 NOTE — TELEPHONE ENCOUNTER
[facility] infusion calls to confirm  That the infusion order for Ms. Torres Arizmendi has not been extended. Best number for Parul Cárdenas is [phone].

## 2018-06-11 NOTE — TELEPHONE ENCOUNTER
[facility] has returned my call and left another message for a nurse to call, I have called and left another message for her to call me back again.

## 2018-06-11 NOTE — TELEPHONE ENCOUNTER
Tani Robertskarolyn has returned my call, she would like to know if patient has finished the Rocephin 2 g's IV every 24 hrs. I advised her that I can not see her hospital records, we did not prescribed the Rocephin, but it looks like she got it in the hospital.  She will try to get more information about the medication. Sandra verbalizes understanding.

## 2018-06-12 ENCOUNTER — OFFICE VISIT (OUTPATIENT)
Dept: FAMILY MEDICINE CLINIC | Age: 73
End: 2018-06-12

## 2018-06-12 VITALS
WEIGHT: 185.2 LBS | DIASTOLIC BLOOD PRESSURE: 58 MMHG | RESPIRATION RATE: 18 BRPM | SYSTOLIC BLOOD PRESSURE: 126 MMHG | HEIGHT: 66 IN | HEART RATE: 96 BPM | BODY MASS INDEX: 29.77 KG/M2 | TEMPERATURE: 98.6 F

## 2018-06-12 DIAGNOSIS — M85.80 OSTEOPENIA, UNSPECIFIED LOCATION: ICD-10-CM

## 2018-06-12 DIAGNOSIS — I87.2 VENOUS INSUFFICIENCY: ICD-10-CM

## 2018-06-12 DIAGNOSIS — L03.115 CELLULITIS OF RIGHT LOWER EXTREMITY: Primary | ICD-10-CM

## 2018-06-12 DIAGNOSIS — E83.52 HYPERCALCEMIA: ICD-10-CM

## 2018-06-12 DIAGNOSIS — I10 ESSENTIAL HYPERTENSION: ICD-10-CM

## 2018-06-12 DIAGNOSIS — R60.0 LOCALIZED EDEMA: ICD-10-CM

## 2018-06-12 RX ORDER — QUETIAPINE FUMARATE 25 MG/1
TABLET, FILM COATED ORAL
COMMUNITY
End: 2018-12-07

## 2018-06-12 NOTE — PROGRESS NOTES
Chief Complaint   Patient presents with    Skin Infection     pic line removal d/t cellulitis infection; pic line right arm     There are no preventive care reminders to display for this patient. Visit Vitals    /58 (BP 1 Location: Left arm, BP Patient Position: Sitting)    Pulse 96    Temp 98.6 °F (37 °C) (Oral)    Resp 18    Ht 5' 6\" (1.676 m)    Wt 185 lb 3.2 oz (84 kg)    BMI 29.89 kg/m2     1. Have you been to the ER, urgent care clinic since your last visit? Hospitalized since your last visit? No    2. Have you seen or consulted any other health care providers outside of the [address] [place] [address] since your last visit? Include any pap smears or colon screening.  No    Renay Bosworth, LPN

## 2018-06-12 NOTE — PATIENT INSTRUCTIONS
Same medications  Lab work today  Work on [employer] and exercise program  Keep follow-up with all your providers  Recheck 2 months sooner if needed

## 2018-06-12 NOTE — MR AVS SNAPSHOT
[facility] [address] 
[phone] Patient: Dominick Car MRN: [id number] Visit Information Date & Time Provider Department Dept. Phone Encounter #  
 6/12/2018 10:10 AM Vickie Otero MD [address] [place] [address] [phone] [id number] Follow-up Instructions Return in about 2 months (around 8/12/2018). Your Appointments 6/28/2018 10:40 AM  
Any with Vickie Otero MD  
[address] [place] [address]) Appt Note: 5 mo f/u,CP$0/9.26.217; 5 mo f/u,CP$0/9.26.217  
 [address]  
[phone]  
  
   
 [address] [address]  
  
    
 7/16/2018 10:50 AM  
Any with Vickie Otero MD  
[address] [place] [address]) Appt Note: 2 month f.up cp$0 5/15/18 hme  
 [facility] [address] [facility]  
[phone] Upcoming Health Maintenance Date Due  
 MEDICARE YEARLY EXAM 6/28/2018 Influenza Age 5 to Adult 8/1/2018 GLAUCOMA SCREENING Q2Y 2/1/2019 COLONOSCOPY 2/16/2020 BREAST CANCER SCRN MAMMOGRAM 6/7/2020 DTaP/Tdap/Td series (2 - Td) 2/18/2025 Allergies as of 6/12/2018  Review Complete On: 6/12/2018 By: Vickie Otero MD  
  
 Severity Noted Reaction Type Reactions Levaquin [Levofloxacin]  06/10/2016    Other (comments) Muscle weakness Percocet [Oxycodone-acetaminophen]  05/09/2016    Other (comments)  
 hallucinate 
halucinations Statins-hmg-coa Reductase Inhibitors  06/10/2016    Other (comments) Muscle weakness Current Immunizations  Reviewed on 3/5/2014 Name Date Influenza High Dose Vaccine PF 9/26/2017 Influenza Vaccine 9/29/2015, 10/10/2013 Influenza Vaccine (Quad) 10/8/2014  4:53 PM  
 Influenza Vaccine (Quad) PF 10/27/2015 Pneumococcal Conjugate (PCV-13) 3/8/2016 Pneumococcal Polysaccharide (PPSV-23) 10/1/2014 Td 10/10/2013 Tdap 2/18/2015 Not reviewed this visit You Were Diagnosed With   
  
 Codes Comments Cellulitis of right lower extremity    -  Primary ICD-10-CM: B07.581 ICD-9-CM: 682.6 Localized edema     ICD-10-CM: R60.0 ICD-9-CM: 782.3 Essential hypertension     ICD-10-CM: I10 
ICD-9-CM: 401.9 Hypercalcemia     ICD-10-CM: Z07.52 
ICD-9-CM: 275.42 Venous insufficiency     ICD-10-CM: I87.2 ICD-9-CM: 459.81   
 BMI 29.0-29.9,adult     ICD-10-CM: D29.53 ICD-9-CM: V85.25 Osteopenia, unspecified location     ICD-10-CM: M85.80 ICD-9-CM: 733.90 Vitals BP Pulse Temp Resp Height(growth percentile) Weight(growth percentile) 126/58 (BP 1 Location: Left arm, BP Patient Position: Sitting) 96 98.6 °F (37 °C) (Oral) 18 5' 6\" (1.676 m) 185 lb 3.2 oz (84 kg) BMI OB Status Smoking Status 29.89 kg/m2 Postmenopausal Former Smoker BMI and BSA Data Body Mass Index Body Surface Area  
 29.89 kg/m 2 1.98 m 2 Preferred Pharmacy Pharmacy Name Phone [facility],[address], [address] [place] [address]  [phone] Your Updated Medication List  
  
   
This list is accurate as of 6/12/18 12:28 PM.  Always use your most recent med list.  
  
  
  
  
 ammonium lactate 12 % lotion Commonly known as:  LAC-HYDRIN  
rub in to affected area well twice daily  
  
 aspirin delayed-release 81 mg tablet Take 1 Tab by mouth daily. buPROPion  mg tablet Commonly known as:  [place] Take 150 mg by mouth. cefTRIAXone 2 gram Solr Commonly known as:  ROCEPHIN  
2 g by IntraVENous route every twenty-four (24) hours. cholecalciferol 5,000 unit capsule Commonly known as:  VITAMIN D3 Take 1 Cap by mouth daily. ESTRING 2 mg (7.5 mcg /24 hour) vaginal ring Generic drug:  estradiol Insert 2 mg into vagina. fenofibrate nanocrystallized 145 mg tablet Commonly known as:  TRICOR  
TAKE ONE TABLET BY MOUTH DAILY  
  
 gabapentin 100 mg capsule Commonly known as:  NEURONTIN Take 300 mg by mouth two (2) times a day. * methylphenidate SR 20 mg tablet Commonly known as:  RITALIN SR Take 30 mg by mouth. * methylphenidate HCl 30 mg LA capsule Commonly known as:  RITALIN SR  
  
 progesterone 100 mg capsule Commonly known as:  PROMETRIUM Take 100 mg by mouth daily. RESTASIS 0.05 % ophthalmic emulsion Generic drug:  cycloSPORINE Administer 1 Drop to both eyes two (2) times a day. rOPINIRole 1 mg tablet Commonly known as:  REQUIP  
TAKE 1/2 TABLET BY MOUTH 3 TIMES A DAY SEROquel 25 mg tablet Generic drug:  QUEtiapine Take  by mouth. temazepam 15 mg capsule Commonly known as:  RESTORIL  
  
 torsemide 20 mg tablet Commonly known as:  DEMADEX Take 1 Tab by mouth daily. tretinoin 0.1 % topical cream  
Commonly known as:  RETIN-A  
APPLY TO AFFECTED AREA EVERY NIGHT  
  
 triamcinolone acetonide 0.1 % topical cream  
Commonly known as:  KENALOG Apply  to affected area two (2) times daily as needed for Skin Irritation. use thin layer VISTARIL 25 mg capsule Generic drug:  hydrOXYzine pamoate Take 25 mg by mouth as needed for Itching. * Notice: This list has 2 medication(s) that are the same as other medications prescribed for you. Read the directions carefully, and ask your doctor or other care provider to review them with you. We Performed the Following COLLECTION VENOUS BLOOD,VENIPUNCTURE [id number] CPT(R)] METABOLIC PANEL, BASIC [91262 CPT(R)] AL HANDLG&/OR CONVEY OF SPEC FOR TR OFFICE TO LAB [68705 CPT(R)] Follow-up Instructions Return in about 2 months (around 8/12/2018). Patient Instructions Same medications Lab work today Work on [employer] and exercise program 
Keep follow-up with all your providers Recheck 2 months sooner if needed Introducing [facility]! [facility] introduces [employer] patient portal. Now you can access parts of your medical record, email your doctor's office, and request medication refills online. 1. In your internet browser, go to [email or web address] 2. Click on the First Time User? Click Here link in the Sign In box. You will see the New Member Sign Up page. 3. Enter your [email or web address] Access Code exactly as it appears below. You will not need to use this code after youve completed the sign-up process. If you do not sign up before the expiration date, you must request a new code. · [email or web address] Access Code: [email or web address] Expires: 9/3/2018  4:02 PM 
 
4. Enter the last four digits of your Social Security Number (xxxx) and Date of Birth (mm/dd/yyyy) as indicated and click Submit. You will be taken to the next sign-up page. 5. Create a [email or web address] ID. This will be your [email or web address] login ID and cannot be changed, so think of one that is secure and easy to remember. 6. Create a [email or web address] password. You can change your password at any time. 7. Enter your Password Reset Question and Answer. This can be used at a later time if you forget your password. 8. Enter your e-mail address. You will receive e-mail notification when new information is available in [address] 9. Click Sign Up. You can now view and download portions of your medical record. 10. Click the Download Summary menu link to download a portable copy of your medical information. If you have questions, please visit the Frequently Asked Questions section of the [email or web address] website. Remember, [email or web address] is NOT to be used for urgent needs. For medical emergencies, dial 911. Now available from your iPhone and Android! Please provide this summary of care documentation to your next provider. Your primary care clinician is listed as Noe Patiño. If you have any questions after today's visit, please call [phone].

## 2018-06-12 NOTE — PROGRESS NOTES
Kaleb Rod is a 68 y.o. female who presents to the office today with the following:  Chief Complaint   Patient presents with    Skin Infection     pic line removal d/t cellulitis infection; pic line right arm       Allergies   Allergen Reactions    Levaquin [Levofloxacin] Other (comments)     Muscle weakness    Percocet [Oxycodone-Acetaminophen] Other (comments)     hallucinate  halucinations      Statins-Hmg-Coa Reductase Inhibitors Other (comments)     Muscle weakness       Current Outpatient Prescriptions   Medication Sig    QUEtiapine (SEROQUEL) 25 mg tablet Take  by mouth.  buPROPion XL (WELLBUTRIN XL) 150 mg tablet Take 150 mg by mouth.  cefTRIAXone (ROCEPHIN) 2 gram solr 2 g by IntraVENous route every twenty-four (24) hours.  torsemide (DEMADEX) 20 mg tablet Take 1 Tab by mouth daily.  aspirin delayed-release 81 mg tablet Take 1 Tab by mouth daily.  methylphenidate HCl (RITALIN SR) 30 mg LA capsule     cholecalciferol (VITAMIN D3) 5,000 unit capsule Take 1 Cap by mouth daily.  progesterone (PROMETRIUM) 100 mg capsule Take 100 mg by mouth daily.  cycloSPORINE (RESTASIS) 0.05 % ophthalmic emulsion Administer 1 Drop to both eyes two (2) times a day.  hydrOXYzine pamoate (VISTARIL) 25 mg capsule Take 25 mg by mouth as needed for Itching.  rOPINIRole (REQUIP) 1 mg tablet TAKE 1/2 TABLET BY MOUTH 3 TIMES A DAY (Patient taking differently: two (2) times a day. TAKE 1/2 TABLET BY MOUTH 3 TIMES A DAY  Indications: pt takes 1 tab QHS)    fenofibrate nanocrystallized (TRICOR) 145 mg tablet TAKE ONE TABLET BY MOUTH DAILY    tretinoin (RETIN-A) 0.1 % topical cream APPLY TO AFFECTED AREA EVERY NIGHT    triamcinolone acetonide (KENALOG) 0.1 % topical cream Apply  to affected area two (2) times daily as needed for Skin Irritation. use thin layer    gabapentin (NEURONTIN) 100 mg capsule Take 300 mg by mouth two (2) times a day.     ESTRING 2 mg (7.5 mcg /24 hour) vaginal ring Insert 2 mg into vagina.  ammonium lactate (LAC-HYDRIN) 12 % lotion rub in to affected area well twice daily    methylphenidate SR (RITALIN SR) 20 mg tablet Take 30 mg by mouth.  temazepam (RESTORIL) 15 mg capsule      No current facility-administered medications for this visit. Past Medical History:   Diagnosis Date    Actinic keratosis     ADHD (attention deficit hyperactivity disorder)     Arthritis     Bipolar affective disorder ([facility] [address]     Dr. Morgan Lerma Colon cancer [facility])     polyps on scope 2/17    Depression     GERD (gastroesophageal reflux disease)     Insomnia     Osteoporosis     Dexa 9/6/16       Past Surgical History:   Procedure Laterality Date    HX BACK SURGERY      HX COLECTOMY  1982    HX GI      HX HEENT      HX MALIGNANT SKIN LESION EXCISION         History   Smoking Status    Former Smoker    Packs/day: 1.00    Quit date: 3/5/1970   Smokeless Tobacco    Never Used       Family History   Problem Relation Age of Onset   [place] Cancer Father      melanoma, larynx    Cancer Mother     Hypertension Mother     Cancer Maternal Aunt      pancreatic    Cancer Maternal Grandmother      pancreatic         History of Present Illness:  Patient here for removal of a PICC line and follow-up of several other medical issues    I did see patient mid-May for an ER follow-up. She was in for routine physical 4/12. Please refer to that note for review of all her medical problems    In May she stumbled and banged her right shin on a piece of metal.  She developed an infected sore and then cellulitis of her right lower extremity. She became acutely ill. Went to the hospital was diagnosed with gram-negative septicemia secondary to her infected leg. X-rays and ultrasound of the leg were essentially normal.  She was treated with IV antibiotics in the hospital and responded well. She was sent home on IV Rocephin through a PICC line. She is completed her course of therapy.   Leg is doing much better at this point. She is here today to have her PICC line removed    Patient does have hypertension. While in the hospital she was hypotensive. They did stop her lisinopril Spironolactone and potassium. Blood pressures remain in the normal range today off these medications. Her renal function was normal on recheck 6/18      Patient does have some mild hypercalcemia. Previous PTH levels have been normal.  Previous vitamin D levels have been normal.  I suspected that her hypercalcemia was due to her Spironolactone. .  As noted she is now off the spironolactone. Lab work done last week reveals that her calcium improved and approaching normal.  Repeat PTH again normal.  Vitamin D was normal.  We will continue current medications and follow closely    Patient does have chronic lower extremity edema and dependent rubor. She again has been following with vascular surgery. She seen several.  She has had some follow-up venous studies which were felt to be normal.  Follow-up arterial studies were also normal.  She tells me her vascular surgeon told her that she has chronic edema but they cannot exactly tell her why. She was on as needed torsemide twice weekly. Now that she is no longer on the spironolactone I recommended she take this on a daily basis. She is tolerating the medication. I am checking lab work today. Her blood pressure remains in good control. Edema is unchanged from last week. Patient has several physicians looking into the cause of her edema. Her orthopedist her vascular surgeon. She has had extensive testing and thus far all is normal.  Thyroid functions are normal.  She is up-to-date with her GYN evaluation without any pelvic issues. She does have upcoming follow-up with her vascular surgeon as well as an appointment with rheumatology for ongoing evaluation recommendations      Patient does have a history of Raynaud's with some dependent rubor. No upper extremity symptoms. Since last visit rheumatologic studies ELBA and rheumatoid tests both negative. She does have an appointment to see rheumatology later on this month. She is going to ask them about her dependent rubor, cold sensitivity and her chronic edema    She does get routine GYN exam pelvic exams. She does have an estrogen ring. We did discuss the risk of estrogen therapy. She has a rescheduled with a new gynecologist and that appointment is coming up later on the summer. Mammogram was normal 6/18    Patient does have history of osteoporosis. As noted she currently has an estrogen ring. She is not interested in bisphosphonates as she has upcoming dental work. DEXA scan done 6/18 shows her T score -2.3 which is improved from -3.0 and 2016    She does have restless leg syndrome. She gets good relief from Requip. Patient would like to continue with this. Patient has requested a referral to neurology because she feels her memory has not been as good of late. Please refer to the note from her April visit. She has spoken with her psychologist who recommended a neurologist that she plans to see later on the summer. Patient does continue to follow with psychiatry for bipolar disease and ADD. They have been making some changes in her medication and her current med list reflex her new medical regimen. Chest x-ray while in the hospital did reveal an elevated right hemidiaphragm. Most recent chest x-ray notes this again but states it is minimally elevated. I did do a right upper quadrant ultrasound in light of the above. Liver appeared normal.  She had some gallbladder sludge which is asymptomatic. Increased echogenicity of the renal parenchyma consistent with medical renal disease. Patient does have DJD of her knee.   She does follow with an orthopedist.  They were talking about doing surgery later on the summer but in light of her lower extremity symptoms they are postponing that until she has further evaluation and recommendations from rheumatology/vascular surgery. Her orthopedist just sent her to see gastroenterology to see if she could have some vitamin or mineral malabsorption leading to her edema. Of note her magnesium is a bit low and she is starting supplementation      Review of Systems:    Review of systems negative except as noted above patient in no acute distress. Vital signs as above. Afebrile. Physical Exam:  Visit Vitals    /58 (BP 1 Location: Left arm, BP Patient Position: Sitting)    Pulse 96    Temp 98.6 °F (37 °C) (Oral)    Resp 18    Ht 5' 6\" (1.676 m)    Wt 185 lb 3.2 oz (84 kg)    BMI 29.89 kg/m2     Vitals:    06/12/18 1042   BP: 126/58   BP 1 Location: Left arm   BP Patient Position: Sitting   Pulse: 96   Resp: 18   Temp: 98.6 °F (37 °C)   TempSrc: Oral   Weight: 185 lb 3.2 oz (84 kg)   Height: 5' 6\" (1.676 m)     Patient no acute distress. Vital signs as above. Blood pressure in good control  She looked quite good today. Alert interactive happy affect was normal  Head was normocephalic  Neck had no masses no JVD  Chest was clear no wheezes rhonchi or rales  Cor regular rate and rhythm no S3-S4  Right upper extremity PICC line site looked good. No erythema no induration. Lower extremities did have bilateral pitting edema to the knees. Healed abrasion right shin. She had venous stasis changes of lower cavity with dependent rubor. Good capillary refill. No acute erythema or inflammation around the previous abrasion site. Office course: The op site dressing was removed from the PICC line. The plastic subcutaneous anchor of the PICC line was opened and removed from the skin. The PICC line itself was pulled. Pressure dressing was applied. There was no oozing or bleeding. Band-Aid was applied. Patient tolerated procedure well        1. Localized edema  Stable. Patients can continue the daily torsemide. Checking lab work today.   She is can try to keep her feet elevated. She has been advised against using compression stockings because of her history of Raynaud's. She is going to follow with vascular surgery and rheumatology for their thoughts    2. Cellulitis of right lower extremity  Resolved. PICC line removed today    3. Essential hypertension  Controlled on current regiment  - METABOLIC PANEL, BASIC  - [place] HANDLG&/OR CONVEY OF SPEC FOR TR OFFICE TO LAB  - COLLECTION VENOUS BLOOD,VENIPUNCTURE    4. Hypercalcemia  Improving off spironolactone. Will follow    5. Venous insufficiency      6. BMI 29.0-29.9,adult  Patient aware the importance of diet and exercise    7. Osteopenia, unspecified location  Improved from previous. We will continue current medications      Patient Instructions   Same medications  Lab work today  Work on [employer] and exercise program  Keep follow-up with all your providers  Recheck 2 months sooner if needed          Continue current therapy plan except for indicated above. Verbal and written instructions (see AVS) provided.  Patient expresses understanding of diagnosis and treatment plan. Follow-up Disposition:  Return in about 2 months (around 8/12/2018). Blaine Blackmon.  Quinton Montenegro MD

## 2018-06-13 LAB
BUN SERPL-MCNC: 20 MG/DL (ref 8–27)
BUN/CREAT SERPL: 24 (ref 12–28)
CALCIUM SERPL-MCNC: 10.2 MG/DL (ref 8.7–10.3)
CHLORIDE SERPL-SCNC: 101 MMOL/L (ref 96–106)
CO2 SERPL-SCNC: 30 MMOL/L (ref 20–29)
CREAT SERPL-MCNC: 0.85 MG/DL (ref 0.57–1)
GFR SERPLBLD CREATININE-BSD FMLA CKD-EPI: 68 ML/MIN/1.73
GFR SERPLBLD CREATININE-BSD FMLA CKD-EPI: 79 ML/MIN/1.73
GLUCOSE SERPL-MCNC: 83 MG/DL (ref 65–99)
POTASSIUM SERPL-SCNC: 3.7 MMOL/L (ref 3.5–5.2)
SODIUM SERPL-SCNC: 147 MMOL/L (ref 134–144)

## 2018-06-13 NOTE — PROGRESS NOTES
Recent lab work reviewed  All lab tests look good, within acceptable limits, calcium back to normal  Patient should continue the current medications  Continue healthy diet    Keep planned follow-up

## 2018-06-27 ENCOUNTER — OFFICE VISIT (OUTPATIENT)
Dept: FAMILY MEDICINE CLINIC | Age: 73
End: 2018-06-27

## 2018-06-27 VITALS
BODY MASS INDEX: 29.09 KG/M2 | HEART RATE: 93 BPM | OXYGEN SATURATION: 99 % | TEMPERATURE: 100.3 F | RESPIRATION RATE: 16 BRPM | HEIGHT: 66 IN | WEIGHT: 181 LBS | DIASTOLIC BLOOD PRESSURE: 67 MMHG | SYSTOLIC BLOOD PRESSURE: 131 MMHG

## 2018-06-27 DIAGNOSIS — R60.0 LOCALIZED EDEMA: ICD-10-CM

## 2018-06-27 DIAGNOSIS — I87.2 VENOUS INSUFFICIENCY: ICD-10-CM

## 2018-06-27 DIAGNOSIS — I73.00 RAYNAUD'S DISEASE WITHOUT GANGRENE: ICD-10-CM

## 2018-06-27 DIAGNOSIS — L03.116 CELLULITIS OF LEFT LOWER EXTREMITY: Primary | ICD-10-CM

## 2018-06-27 DIAGNOSIS — E83.52 HYPERCALCEMIA: ICD-10-CM

## 2018-06-27 NOTE — PROGRESS NOTES
Mami Lindo is a 68 y.o. female who presents to the office today with the following:  Chief Complaint   Patient presents with    Leg Pain     right lower leg pain, swelling, redness       Allergies   Allergen Reactions    Levaquin [Levofloxacin] Other (comments)     Muscle weakness    Percocet [Oxycodone-Acetaminophen] Other (comments)     hallucinate  halucinations      Statins-Hmg-Coa Reductase Inhibitors Other (comments)     Muscle weakness       Current Outpatient Prescriptions   Medication Sig    QUEtiapine (SEROQUEL) 25 mg tablet Take  by mouth.  buPROPion XL (WELLBUTRIN XL) 150 mg tablet Take 150 mg by mouth.  torsemide (DEMADEX) 20 mg tablet Take 1 Tab by mouth daily.  aspirin delayed-release 81 mg tablet Take 1 Tab by mouth daily.  temazepam (RESTORIL) 15 mg capsule     methylphenidate HCl (RITALIN SR) 30 mg LA capsule     cholecalciferol (VITAMIN D3) 5,000 unit capsule Take 1 Cap by mouth daily.  progesterone (PROMETRIUM) 100 mg capsule Take 100 mg by mouth daily.  cycloSPORINE (RESTASIS) 0.05 % ophthalmic emulsion Administer 1 Drop to both eyes two (2) times a day.  hydrOXYzine pamoate (VISTARIL) 25 mg capsule Take 25 mg by mouth as needed for Itching.  rOPINIRole (REQUIP) 1 mg tablet TAKE 1/2 TABLET BY MOUTH 3 TIMES A DAY (Patient taking differently: two (2) times a day. TAKE 1/2 TABLET BY MOUTH 3 TIMES A DAY  Indications: pt takes 1 tab QHS)    fenofibrate nanocrystallized (TRICOR) 145 mg tablet TAKE ONE TABLET BY MOUTH DAILY    tretinoin (RETIN-A) 0.1 % topical cream APPLY TO AFFECTED AREA EVERY NIGHT    gabapentin (NEURONTIN) 100 mg capsule Take 300 mg by mouth two (2) times a day.  ESTRING 2 mg (7.5 mcg /24 hour) vaginal ring Insert 2 mg into vagina.  ammonium lactate (LAC-HYDRIN) 12 % lotion rub in to affected area well twice daily    methylphenidate SR (RITALIN SR) 20 mg tablet Take 30 mg by mouth.     triamcinolone acetonide (KENALOG) 0.1 % topical cream Apply  to affected area two (2) times daily as needed for Skin Irritation. use thin layer     No current facility-administered medications for this visit. Past Medical History:   Diagnosis Date    Actinic keratosis     ADHD (attention deficit hyperactivity disorder)     Arthritis     Bipolar affective disorder ([facility] [address]     Dr. Willow Young Colon cancer [facility])     polyps on scope 2/17    Depression     GERD (gastroesophageal reflux disease)     Insomnia     Osteoporosis     Dexa 9/6/16       Past Surgical History:   Procedure Laterality Date    HX BACK SURGERY      HX COLECTOMY  1982    HX GI      HX HEENT      HX MALIGNANT SKIN LESION EXCISION         History   Smoking Status    Former Smoker    Packs/day: 1.00    Quit date: 3/5/1970   Smokeless Tobacco    Never Used       Family History   Problem Relation Age of Onset   [address] [facility] Father      melanoma, larynx    Cancer Mother     Hypertension Mother     Cancer Maternal Aunt      pancreatic    Cancer Maternal Grandmother      pancreatic         History of Present Illness:  Patient here for evaluation redness and swelling left lower extremity    Patient has a history of chronic lower extremity edema and venous stasis. She also admits she bangs into things frequently and gets sores on her legs. End of last month she was admitted for cellulitis right lower extremity. She was treated with Rocephin. She was discharged with a PICC line. She completed a course of therapy. 2 days ago she states she banged into something and got a sore on her left anterior lower extremities. Yesterday there was some slight surrounding erythema. Much worse today. Her leg is swollen and sore. Her calf is swollen and sore. She is running a low-grade fever    As noted patient does have chronic lower extremity edema. This is been extensively evaluated. She has been seen by several vascular surgeons dermatology.   No specific etiology other than venous stasis and venous insufficiency has been found. I recently changed her diuretic therapy because of some hypocalcemia secondary to her Spironolactone. She is now on torsemide on a daily basis. She is tolerating this change well. Her weight is actually down 4 pounds since her previous visit. Looking at her right leg today which is her worst leg it actually looks the best I have seen it in terms of swelling and redness. As noted in addition to swelling patient does have persistent dependent rubor and redness of her lower extremity's. Again she has been seen by vascular surgery dermatology. She was referred to rheumatology because of her Raynaud's. She saw them this morning. They told her they thought she had venous stasis changes. They recommended cold precaution and support hose for her lower extremities. Her rheumatologic blood tests have been normal.    Patient's blood pressure does remain in good control on her current regimen    Patient does have a history of alcohol abuse. We have talked about this as I feel some of her injuries may be due to consuming alcohol    She was due to see me back later this week for routine medical follow-up.   This is can be postponed until after treatment of her cellulitis    Review of Systems:      Review of systems negative except as noted above    Physical Exam:  Visit Vitals    /67 (BP 1 Location: Left arm, BP Patient Position: Sitting)    Pulse 93    Temp 100.3 °F (37.9 °C) (Oral)    Resp 16    Ht 5' 6\" (1.676 m)    Wt 181 lb (82.1 kg)    SpO2 99%    BMI 29.21 kg/m2     Vitals:    06/27/18 1433   BP: 131/67   BP 1 Location: Left arm   BP Patient Position: Sitting   Pulse: 93   Resp: 16   Temp: 100.3 °F (37.9 °C)   TempSrc: Oral   SpO2: 99%   Weight: 181 lb (82.1 kg)   Height: 5' 6\" (1.676 m)     Patient was in no acute distress but febrile in my office  Chest was clear no wheezes rhonchi rales  Cor regular rate and rhythm  Right lower extremity had 1+ edema to the knee which is improved from previous. There was minimal dependent rubor today. Again improved from previous  Left lower extremity had a superficial abrasion on her shin. There is extensive surrounding erythema. The calf itself was swollen and tender. Assessment/Plan:  1. Cellulitis of left lower extremity  Patient with acute cellulitis left lower extremity with significant edema. She needs to return to the emergency room. DVT needs to be ruled out. Her partner is willing to take her to the emergency room now. Given her past history I suspect she needs to be admitted for IV antibiotics  2. Localized edema      3. Venous insufficiency      4. Raynaud's disease without gangrene  Seen by rheumatology this morning    5. Hypercalcemia  Improved off spironolactone          Continue current therapy plan except for indicated above. Verbal and written instructions (see AVS) provided.  Patient expresses understanding of diagnosis and treatment plan. Follow-up Disposition: Not on [address] [place] [address].  Verna Obrien MD

## 2018-06-27 NOTE — MR AVS SNAPSHOT
[facility] 
[phone] Patient: Johan Rico MRN: [id number] XRU:[birth year] Visit Information Date & Time Provider Department Dept. Phone Encounter #  
 6/27/2018  2:20 PM Dariel Dolan  [facility] [phone] [id number] Your Appointments 7/16/2018 10:50 AM  
Any with Dariel Dolan MD  
[facility] [address]) Appt Note: 2 month f.up cp$0 5/15/18 hme  
 [address] [phone]  
[phone]  
  
   
 [address] [address] Upcoming Health Maintenance Date Due  
 MEDICARE YEARLY EXAM 6/28/2018 Influenza Age 5 to Adult 8/1/2018 GLAUCOMA SCREENING Q2Y 2/1/2019 COLONOSCOPY 2/16/2020 BREAST CANCER SCRN MAMMOGRAM 6/7/2020 DTaP/Tdap/Td series (2 - Td) 2/18/2025 Allergies as of 6/27/2018  Review Complete On: 6/27/2018 By: Dariel Dolan MD  
  
 Severity Noted Reaction Type Reactions Levaquin [Levofloxacin]  06/10/2016    Other (comments) Muscle weakness Percocet [Oxycodone-acetaminophen]  05/09/2016    Other (comments)  
 hallucinate 
halucinations Statins-hmg-coa Reductase Inhibitors  06/10/2016    Other (comments) Muscle weakness Current Immunizations  Reviewed on 3/5/2014 Name Date Influenza High Dose Vaccine PF 9/26/2017 Influenza Vaccine 9/29/2015, 10/10/2013 Influenza Vaccine (Quad) 10/8/2014  4:53 PM  
 Influenza Vaccine (Quad) PF 10/27/2015 Pneumococcal Conjugate (PCV-13) 3/8/2016 Pneumococcal Polysaccharide (PPSV-23) 10/1/2014 Td 10/10/2013 Tdap 2/18/2015 Not reviewed this visit Vitals BP Pulse Temp Resp Height(growth percentile) Weight(growth percentile) 131/67 (BP 1 Location: Left arm, BP Patient Position: Sitting) 93 100.3 °F (37.9 °C) (Oral) 16 5' 6\" (1.676 m) 181 lb (82.1 kg) SpO2 BMI OB Status Smoking Status 99% 29.21 kg/m2 Postmenopausal Former Smoker BMI and BSA Data Body Mass Index Body Surface Area  
 29.21 kg/m 2 1.96 m 2 Preferred Pharmacy Pharmacy Name Phone [address], [address] [place] [address]  [phone] Your Updated Medication List  
  
   
This list is accurate as of 6/27/18  3:17 PM.  Always use your most recent med list.  
  
  
  
  
 ammonium lactate 12 % lotion Commonly known as:  LAC-HYDRIN  
rub in to affected area well twice daily  
  
 aspirin delayed-release 81 mg tablet Take 1 Tab by mouth daily. buPROPion  mg tablet Commonly known as:  Varsha Erm Take 150 mg by mouth. cholecalciferol 5,000 unit capsule Commonly known as:  VITAMIN D3 Take 1 Cap by mouth daily. ESTRING 2 mg (7.5 mcg /24 hour) vaginal ring Generic drug:  estradiol Insert 2 mg into vagina. fenofibrate nanocrystallized 145 mg tablet Commonly known as:  TRICOR  
TAKE ONE TABLET BY MOUTH DAILY  
  
 gabapentin 100 mg capsule Commonly known as:  NEURONTIN Take 300 mg by mouth two (2) times a day. * methylphenidate SR 20 mg tablet Commonly known as:  RITALIN SR Take 30 mg by mouth. * methylphenidate HCl 30 mg LA capsule Commonly known as:  RITALIN SR  
  
 progesterone 100 mg capsule Commonly known as:  PROMETRIUM Take 100 mg by mouth daily. RESTASIS 0.05 % ophthalmic emulsion Generic drug:  cycloSPORINE Administer 1 Drop to both eyes two (2) times a day. rOPINIRole 1 mg tablet Commonly known as:  REQUIP  
TAKE 1/2 TABLET BY MOUTH 3 TIMES A DAY SEROquel 25 mg tablet Generic drug:  QUEtiapine Take  by mouth. temazepam 15 mg capsule Commonly known as:  RESTORIL  
  
 torsemide 20 mg tablet Commonly known as:  DEMADEX Take 1 Tab by mouth daily.   
  
 tretinoin 0.1 % topical cream  
Commonly known as:  RETIN-A  
 APPLY TO AFFECTED AREA EVERY NIGHT  
  
 triamcinolone acetonide 0.1 % topical cream  
Commonly known as:  KENALOG Apply  to affected area two (2) times daily as needed for Skin Irritation. use thin layer VISTARIL 25 mg capsule Generic drug:  hydrOXYzine pamoate Take 25 mg by mouth as needed for Itching. * Notice: This list has 2 medication(s) that are the same as other medications prescribed for you. Read the directions carefully, and ask your doctor or other care provider to review them with you. Introducing [facility]! [employer] introduces path intelligence patient portal. Now you can access parts of your medical record, email your doctor's office, and request medication refills online. 1. In your internet browser, go to [email or web address] 2. Click on the First Time User? Click Here link in the Sign In box. You will see the New Member Sign Up page. 3. Enter your path intelligence Access Code exactly as it appears below. You will not need to use this code after youve completed the sign-up process. If you do not sign up before the expiration date, you must request a new code. · path intelligence Access Code: [email or web address] Expires: 9/3/2018  4:02 PM 
 
4. Enter the last four digits of your Social Security Number (xxxx) and Date of Birth (mm/dd/yyyy) as indicated and click Submit. You will be taken to the next sign-up page. 5. Create a path intelligence ID. This will be your path intelligence login ID and cannot be changed, so think of one that is secure and easy to remember. 6. Create a path intelligence password. You can change your password at any time. 7. Enter your Password Reset Question and Answer. This can be used at a later time if you forget your password. 8. Enter your e-mail address. You will receive e-mail notification when new information is available in [address] 9. Click Sign Up. You can now view and download portions of your medical record. 10. Click the Download Summary menu link to download a portable copy of your medical information. If you have questions, please visit the Frequently Asked Questions section of the [employer] website. Remember, [employer] is NOT to be used for urgent needs. For medical emergencies, dial 911. Now available from your iPhone and Android! Please provide this summary of care documentation to your next provider. Your primary care clinician is listed as Benita Muir. If you have any questions after today's visit, please call [phone].

## 2018-06-27 NOTE — PROGRESS NOTES
Chief Complaint   Patient presents with    Leg Pain     right lower leg pain, swelling, redness     There are no preventive care reminders to display for this patient. Visit Vitals    /67 (BP 1 Location: Left arm, BP Patient Position: Sitting)    Pulse 93    Temp 100.3 °F (37.9 °C) (Oral)    Resp 16    Ht 5' 6\" (1.676 m)    Wt 181 lb (82.1 kg)    SpO2 99%    BMI 29.21 kg/m2     1. Have you been to the ER, urgent care clinic since your last visit? Hospitalized since your last visit? No    2. Have you seen or consulted any other health care providers outside of the [address] [place] [address] since your last visit? Include any pap smears or colon screening.  Yes Reason for visit: Nazario Palacios LPN

## 2018-06-28 DIAGNOSIS — J98.6 ELEVATED HEMIDIAPHRAGM: ICD-10-CM

## 2018-06-28 DIAGNOSIS — M81.0 OSTEOPOROSIS WITHOUT CURRENT PATHOLOGICAL FRACTURE, UNSPECIFIED OSTEOPOROSIS TYPE: ICD-10-CM

## 2018-07-03 ENCOUNTER — TELEPHONE (OUTPATIENT)
Dept: FAMILY MEDICINE CLINIC | Age: 73
End: 2018-07-03

## 2018-07-03 NOTE — TELEPHONE ENCOUNTER
[facility] discharge planningcalls to state that this pt will discharge to home health service today.

## 2018-07-05 ENCOUNTER — DOCUMENTATION ONLY (OUTPATIENT)
Dept: FAMILY MEDICINE CLINIC | Age: 73
End: 2018-07-05

## 2018-07-05 ENCOUNTER — OFFICE VISIT (OUTPATIENT)
Dept: FAMILY MEDICINE CLINIC | Age: 73
End: 2018-07-05

## 2018-07-05 VITALS
SYSTOLIC BLOOD PRESSURE: 143 MMHG | WEIGHT: 175.4 LBS | HEIGHT: 66 IN | RESPIRATION RATE: 16 BRPM | HEART RATE: 86 BPM | TEMPERATURE: 98.4 F | DIASTOLIC BLOOD PRESSURE: 75 MMHG | BODY MASS INDEX: 28.19 KG/M2

## 2018-07-05 DIAGNOSIS — L03.116 CELLULITIS OF LEFT LOWER EXTREMITY: ICD-10-CM

## 2018-07-05 DIAGNOSIS — E04.1 THYROID NODULE: ICD-10-CM

## 2018-07-05 DIAGNOSIS — I10 ESSENTIAL HYPERTENSION: ICD-10-CM

## 2018-07-05 DIAGNOSIS — J32.9 CHRONIC SINUSITIS, UNSPECIFIED LOCATION: ICD-10-CM

## 2018-07-05 DIAGNOSIS — K74.60 CIRRHOSIS OF LIVER WITHOUT ASCITES, UNSPECIFIED HEPATIC CIRRHOSIS TYPE (HCC): ICD-10-CM

## 2018-07-05 DIAGNOSIS — R60.0 LOCALIZED EDEMA: Primary | ICD-10-CM

## 2018-07-05 DIAGNOSIS — I87.2 VENOUS INSUFFICIENCY: ICD-10-CM

## 2018-07-05 DIAGNOSIS — Z86.19 HISTORY OF SEPSIS: ICD-10-CM

## 2018-07-05 PROBLEM — I73.9 PERIPHERAL VASCULAR DISEASE OF LOWER EXTREMITY (HCC): Status: RESOLVED | Noted: 2018-05-15 | Resolved: 2018-07-05

## 2018-07-05 PROBLEM — M81.0 OSTEOPOROSIS WITHOUT CURRENT PATHOLOGICAL FRACTURE: Status: RESOLVED | Noted: 2017-06-27 | Resolved: 2018-07-05

## 2018-07-05 PROBLEM — L30.9 DERMATITIS: Status: RESOLVED | Noted: 2017-03-02 | Resolved: 2018-07-05

## 2018-07-05 RX ORDER — CEFTRIAXONE 2 G/50ML
1 INJECTION, SOLUTION INTRAVENOUS
COMMUNITY
Start: 2018-07-03 | End: 2018-10-15 | Stop reason: ALTCHOICE

## 2018-07-05 NOTE — PROGRESS NOTES
You did a referral for infectious [facility] at [facility] they do not offer this thru [place] but have a doctor in [place] is this okay since the patient requested [place] appointments and they also have the main office in [place] so limited visits to [place] are available

## 2018-07-05 NOTE — MR AVS SNAPSHOT
[facility] [place] [address] 
[phone] Patient: Kaylyn Morse MRN: [id number] [facility]:[birth year] Visit Information Date & Time Provider Department Dept. Phone Encounter #  
 7/5/2018 11:00 AM Lynsey Harkins MD [address] [place] [address] [phone] [id number] Follow-up Instructions Return in about 1 month (around 8/5/2018). Your Appointments 8/9/2018 11:00 AM  
Any with Lynsey Harkins MD  
[address] [place] [address] [facility]) Appt Note: 1 mo f/u $0 CP mbm  
 [address] [address]  
[phone]  
  
   
 [address] [address] Upcoming Health Maintenance Date Due  
 MEDICARE YEARLY EXAM 6/28/2018 Influenza Age 5 to Adult 8/1/2018 GLAUCOMA SCREENING Q2Y 2/1/2019 COLONOSCOPY 2/16/2020 BREAST CANCER SCRN MAMMOGRAM 6/7/2020 DTaP/Tdap/Td series (2 - Td) 2/18/2025 Allergies as of 7/5/2018  Review Complete On: 7/5/2018 By: Lynsey Harkins MD  
  
 Severity Noted Reaction Type Reactions Levaquin [Levofloxacin]  06/10/2016    Other (comments) Muscle weakness Percocet [Oxycodone-acetaminophen]  05/09/2016    Other (comments)  
 hallucinate 
halucinations Statins-hmg-coa Reductase Inhibitors  06/10/2016    Other (comments) Muscle weakness Current Immunizations  Reviewed on 3/5/2014 Name Date Influenza High Dose Vaccine PF 9/26/2017 Influenza Vaccine 9/29/2015, 10/10/2013 Influenza Vaccine (Quad) 10/8/2014  4:53 PM  
 Influenza Vaccine (Quad) PF 10/27/2015 Pneumococcal Conjugate (PCV-13) 3/8/2016 Pneumococcal Polysaccharide (PPSV-23) 10/1/2014 Td 10/10/2013 Tdap 2/18/2015 Not reviewed this visit You Were Diagnosed With   
  
 Codes Comments Localized edema    -  Primary ICD-10-CM: R60.0 ICD-9-CM: 782.3 Cellulitis of left lower extremity     ICD-10-CM: L03.116 ICD-9-CM: 103. 6 Vitals BP Pulse Temp Resp Height(growth percentile) Weight(growth percentile) 143/75 (BP 1 Location: Left arm, BP Patient Position: Sitting) 86 98.4 °F (36.9 °C) (Oral) 16 5' 6\" (1.676 m) 175 lb 6.4 oz (79.6 kg) BMI OB Status Smoking Status 28.31 kg/m2 Postmenopausal Former Smoker Vitals History BMI and BSA Data Body Mass Index Body Surface Area  
 28.31 kg/m 2 1.93 m 2 Preferred Pharmacy Pharmacy Name Phone [address] [facility],[address] Floor, [address] [place] [address] Dr [phone] Your Updated Medication List  
  
   
This list is accurate as of 7/5/18 11:48 AM.  Always use your most recent med list.  
  
  
  
  
 ammonium lactate 12 % lotion Commonly known as:  LAC-HYDRIN  
rub in to affected area well twice daily  
  
 aspirin delayed-release 81 mg tablet Take 1 Tab by mouth daily. buPROPion  mg tablet Commonly known as:  Genene Morganza Take 150 mg by mouth. cefTRIAXone 2 gram/50 mL IVPB Commonly known as:  ROCEPHIN  
1 g by IntraVENous route. cholecalciferol 5,000 unit capsule Commonly known as:  VITAMIN D3 Take 1 Cap by mouth daily. ESTRING 2 mg (7.5 mcg /24 hour) vaginal ring Generic drug:  estradiol Insert 2 mg into vagina. fenofibrate nanocrystallized 145 mg tablet Commonly known as:  TRICOR  
TAKE ONE TABLET BY MOUTH DAILY  
  
 gabapentin 100 mg capsule Commonly known as:  NEURONTIN Take 300 mg by mouth two (2) times a day. * methylphenidate SR 20 mg tablet Commonly known as:  RITALIN SR Take 30 mg by mouth. * methylphenidate HCl 30 mg LA capsule Commonly known as:  RITALIN SR  
  
 progesterone 100 mg capsule Commonly known as:  PROMETRIUM Take 100 mg by mouth daily. RESTASIS 0.05 % ophthalmic emulsion Generic drug:  cycloSPORINE Administer 1 Drop to both eyes two (2) times a day. rOPINIRole 1 mg tablet Commonly known as:  REQUIP  
TAKE 1/2 TABLET BY MOUTH 3 TIMES A DAY SEROquel 25 mg tablet Generic drug:  QUEtiapine Take  by mouth. temazepam 15 mg capsule Commonly known as:  RESTORIL  
  
 torsemide 20 mg tablet Commonly known as:  DEMADEX Take 1 Tab by mouth daily. tretinoin 0.1 % topical cream  
Commonly known as:  RETIN-A  
APPLY TO AFFECTED AREA EVERY NIGHT  
  
 triamcinolone acetonide 0.1 % topical cream  
Commonly known as:  KENALOG Apply  to affected area two (2) times daily as needed for Skin Irritation. use thin layer VISTARIL 25 mg capsule Generic drug:  hydrOXYzine pamoate Take 25 mg by mouth as needed for Itching. * Notice: This list has 2 medication(s) that are the same as other medications prescribed for you. Read the directions carefully, and ask your doctor or other care provider to review them with you. We Performed the Following CBC WITH AUTOMATED DIFF [76123 CPT(R)] COLLECTION VENOUS BLOOD,VENIPUNCTURE [id number] CPT(R)] METABOLIC PANEL, BASIC [46090 CPT(R)] [place] HANDLG&/OR CONVEY OF SPEC FOR TR OFFICE TO LAB [08972 CPT(R)] Follow-up Instructions Return in about 1 month (around 8/5/2018). Patient Instructions Same meds Lab work today 
torsiminde every other day if needed NO ALCOHOL 
referal to infectious disease and Hepatology Keep follow up ENT Keep follow up appt, sooner if needed Introducing [facility]! [employer] introduces [employer] patient portal. Now you can access parts of your medical record, email your doctor's office, and request medication refills online. 1. In your internet browser, go to [email or web address] 2. Click on the First Time User? Click Here link in the Sign In box. You will see the New Member Sign Up page. 3. Enter your [employer] Access Code exactly as it appears below.  You will not need to use this code after youve completed the sign-up process. If you do not sign up before the expiration date, you must request a new code. · [employer] Access Code: [email or web address] Expires: 9/3/2018  4:02 PM 
 
4. Enter the last four digits of your Social Security Number (xxxx) and Date of Birth (mm/dd/yyyy) as indicated and click Submit. You will be taken to the next sign-up page. 5. Create a [employer] ID. This will be your [employer] login ID and cannot be changed, so think of one that is secure and easy to remember. 6. Create a [employer] password. You can change your password at any time. 7. Enter your Password Reset Question and Answer. This can be used at a later time if you forget your password. 8. Enter your e-mail address. You will receive e-mail notification when new information is available in [address] 9. Click Sign Up. You can now view and download portions of your medical record. 10. Click the Download Summary menu link to download a portable copy of your medical information. If you have questions, please visit the Frequently Asked Questions section of the [employer] website. Remember, [employer] is NOT to be used for urgent needs. For medical emergencies, dial 911. Now available from your iPhone and Android! Please provide this summary of care documentation to your next provider. Your primary care clinician is listed as Emili Leroy. If you have any questions after today's visit, please call [phone].

## 2018-07-05 NOTE — PROGRESS NOTES
Chief Complaint   Patient presents with    Follow-up     sepsis     Visit Vitals    /75 (BP 1 Location: Left arm, BP Patient Position: Sitting)    Pulse 86    Temp 98.4 °F (36.9 °C) (Oral)    Resp 16    Ht 5' 6\" (1.676 m)    Wt 175 lb 6.4 oz (79.6 kg)    BMI 28.31 kg/m2     1. Have you been to the ER, urgent care clinic since your last visit? Hospitalized since your last visit? Humphrey Lopes at [facility] in [place] for Sepsis,June,2018. Felicity Pierre LPN      2. Have you seen or consulted any other health care providers outside of the [address] [place] [address] since your last visit? Include any pap smears or colon screening.  No

## 2018-07-05 NOTE — PATIENT INSTRUCTIONS
Same meds  Lab work today  torsiminde every other day if needed  NO ALCOHOL  referal to infectious disease and Hepatology  Keep follow up ENT  Keep follow up appt, sooner if needed

## 2018-07-05 NOTE — PROGRESS NOTES
Called [facility] sent them all the records from [facility] and your note they have one doctor that services all [place] and [facility] Dr Selene Begum he reviews records on a case to case approval system, he currently is booked out til end of September without reviewing any current records he has.

## 2018-07-05 NOTE — PROGRESS NOTES
Jeffrey Ramirez is a 68 y.o. female who presents to the office today with the following:  Chief Complaint   Patient presents with    Follow-up     sepsis       Allergies   Allergen Reactions    Levaquin [Levofloxacin] Other (comments)     Muscle weakness    Percocet [Oxycodone-Acetaminophen] Other (comments)     hallucinate  halucinations      Statins-Hmg-Coa Reductase Inhibitors Other (comments)     Muscle weakness       Current Outpatient Prescriptions   Medication Sig    cefTRIAXone (ROCEPHIN) 2 gram/50 mL IVPB 1 g by IntraVENous route.  QUEtiapine (SEROQUEL) 25 mg tablet Take  by mouth.  buPROPion XL (WELLBUTRIN XL) 150 mg tablet Take 150 mg by mouth.  torsemide (DEMADEX) 20 mg tablet Take 1 Tab by mouth daily. (Patient taking differently: Take 20 mg by mouth daily. Taking 2 as needed.)    methylphenidate SR (RITALIN SR) 20 mg tablet Take 30 mg by mouth.  aspirin delayed-release 81 mg tablet Take 1 Tab by mouth daily.  temazepam (RESTORIL) 15 mg capsule     methylphenidate HCl (RITALIN SR) 30 mg LA capsule     cholecalciferol (VITAMIN D3) 5,000 unit capsule Take 1 Cap by mouth daily.  progesterone (PROMETRIUM) 100 mg capsule Take 100 mg by mouth daily.  cycloSPORINE (RESTASIS) 0.05 % ophthalmic emulsion Administer 1 Drop to both eyes two (2) times a day.  hydrOXYzine pamoate (VISTARIL) 25 mg capsule Take 25 mg by mouth as needed for Itching.  rOPINIRole (REQUIP) 1 mg tablet TAKE 1/2 TABLET BY MOUTH 3 TIMES A DAY (Patient taking differently: two (2) times a day. TAKE 1/2 TABLET BY MOUTH 3 TIMES A DAY  Indications: pt takes 1 tab QHS)    fenofibrate nanocrystallized (TRICOR) 145 mg tablet TAKE ONE TABLET BY MOUTH DAILY    tretinoin (RETIN-A) 0.1 % topical cream APPLY TO AFFECTED AREA EVERY NIGHT    triamcinolone acetonide (KENALOG) 0.1 % topical cream Apply  to affected area two (2) times daily as needed for Skin Irritation.  use thin layer    gabapentin (NEURONTIN) 100 mg capsule Take 300 mg by mouth two (2) times a day.  ESTRING 2 mg (7.5 mcg /24 hour) vaginal ring Insert 2 mg into vagina.  ammonium lactate (LAC-HYDRIN) 12 % lotion rub in to affected area well twice daily     No current facility-administered medications for this visit. Past Medical History:   Diagnosis Date    Actinic keratosis     ADHD (attention deficit hyperactivity disorder)     Arthritis     [facility]     Dr. Heydi Lerner Colon cancer [facility])     polyps on scope 2/17    Depression     GERD (gastroesophageal reflux disease)     Insomnia     Osteoporosis     Dexa 9/6/16       Past Surgical History:   Procedure Laterality Date    HX BACK SURGERY      HX COLECTOMY  1982    HX GI      HX HEENT      HX MALIGNANT SKIN LESION EXCISION         History   Smoking Status    Former Smoker    Packs/day: 1.00    Quit date: 3/5/1970   Smokeless Tobacco    Never Used       Family History   Problem Relation Age of Onset    Cancer Father      melanoma, larynx    Cancer Mother     Hypertension Mother     Cancer Maternal Aunt      pancreatic    Cancer Maternal Grandmother      pancreatic         History of Present Illness:  Patient here for hospital follow-up/sepsis    I had seen patient a couple weeks ago. She presented acutely ill with a fever. She also had an abrasion left shin and erythematous swollen left leg. I sent patient to the emergency room and she was admitted. Ultrasound negative for DVT. Blood cultures grew out H flu. She was treated with IV antibiotics in the hospital.  Currently on IV Rocephin daily through a PICC line. She has 9 days more of antibiotic therapy. She is feeling much better at this point afebrile. Lab work in the hospital can panel essentially normal.  CBC with normal white count on discharge just mild anemia. Patient was admitted earlier this spring with cellulitis.   At that time she had redness and swelling of her right lower extremity. She also grew out H flu at that time. This being an unusual pathogen additional workup was done in the hospital.  She was found on CAT scan to have chronic dental infection and sinus infection. It is felt that this is the likely source of her sepsis not the legs. She does follow with her dentist.  She is also made a follow-up to see ENT for their evaluation of the above. She was followed by infectious disease in the hospital.  She is supposed to have a follow-up with infectious disease upon discharge from the hospital.  Her insurance does not cover visits with the specialist.  She was requesting referral to infectious disease through [facility]. Visiting nurses are in to manage the PICC line and they will be removing this at the end of therapy. CAT scan of her head and neck in the hospital also revealed a 9 mm thyroid nodule. Previous thyroid functions are normal.  I am ordering a thyroid ultrasound    CAT scan of her abdomen and pelvis in the hospital again looking for causes of her sepsis did not reveal any abscess or cause of her sepsis. It did however reveal cirrhosis of her liver with some portal and esophageal varices. Patient does have a problem with alcohol abuse. We have discussed on multiple occasions importance of not drinking. She tells me she drinks very little however she has been in the emergency room with elevated alcohol levels within the last several months. Her liver functions are normal.  Previous ultrasound of her liver was read as normal.  Her hepatitis B and hepatitis C titers negative. I am referring patient to hepatology for evaluation of her cirrhosis. Patient has chronic lower extremity edema. This is been an ongoing problem. She seen multiple vascular specialist.  Negative vascular workup although she is been diagnosed with venous insufficiency. I recently stopped her Aldactone and increased her torsemide to daily.   This actually has done a remarkable job of improving her edema. Her weight is down and the swelling is down she is pleased. Most recent base metabolic profile normal.  I am checking lab work today. We have been searching for another cause for her chronic edema. I am suspicious that her hepatic cirrhosis is likely a causative factor and I am referring to hepatology as noted above    Patient does have Raynaud's. Currently seeing rheumatology. Rheumatologic workup. Patient does have chronic psychiatric issues and continues to follow with psychiatrist    Other medical problems include restless leg syndrome, remote history of malignant colon polyp, osteoporosis     Review of Systems:    Review of systems negative except as noted above      Physical Exam:  Visit Vitals    /75 (BP 1 Location: Left arm, BP Patient Position: Sitting)    Pulse 86    Temp 98.4 °F (36.9 °C) (Oral)    Resp 16    Ht 5' 6\" (1.676 m)    Wt 175 lb 6.4 oz (79.6 kg)    BMI 28.31 kg/m2     Vitals:    07/05/18 1101   BP: 143/75   BP 1 Location: Left arm   BP Patient Position: Sitting   Pulse: 86   Resp: 16   Temp: 98.4 °F (36.9 °C)   TempSrc: Oral   Weight: 175 lb 6.4 oz (79.6 kg)   Height: 5' 6\" (1.676 m)     Patient was in no acute distress. Looking much better than when I saw her last visit  Vital signs as above. Weight down from previous. Afebrile   chest was clear  Cor regular rate and rhythm  Abdomen soft and nontender  Right lower extremity showed venous stasis changes but only trace edema today  Left lower extremity had healing abrasion anterior shin. Left calf was still swollen but improved from last visit. Erythema anterior shin improved from previous  Patient had PICC line in place    Assessment/Plan:  1. Cellulitis of left lower extremity  Improving. Patient will complete her course of Rocephin. Nurses are scheduled to remove the PICC line after therapy.   I do plan to see her back the first part of August  - CBC WITH AUTOMATED DIFF  - IN HANDLG&/OR CONVEY OF SPEC FOR TR OFFICE TO LAB  - COLLECTION VENOUS BLOOD,VENIPUNCTURE    2. History of sepsis  Making referral to infectious disease to follow-up from her recent hospitalizations  - REFERRAL TO INFECTIOUS DISEASE    3. Localized edema  Question of secondary to cirrhosis. Did not respond to Aldactone  She will continue the torsemide. She does not need to use this daily at this point. Every other day as needed should work for her  - METABOLIC PANEL, BASIC    4. Chronic sinusitis, unspecified location  Patient is scheduling follow-up with ENT    5. Cirrhosis of liver without ascites, unspecified hepatic cirrhosis type ([address]  Extreme importance of avoidance of alcohol stressed with patient. I am referring her to hepatology for further evaluation recommendations  - REFERRAL TO LIVER HEPATOLOGY    6. Venous insufficiency      7. Essential hypertension  Blood pressure controlled today    8. Thyroid nodule  Recent thyroid function test normal.  Will check ultrasound  - US THYROID/PARATHYROID/SOFT TISS; Future    Patient Instructions   Same meds  Lab work today  torsiminde every other day if needed  NO ALCOHOL  referal to infectious disease and Hepatology  Keep follow up ENT  Keep follow up appt, sooner if needed          Continue current therapy plan except for indicated above. Verbal and written instructions (see AVS) provided.  Patient expresses understanding of diagnosis and treatment plan. Follow-up Disposition:  Return in about 1 month (around 8/5/2018). Dulce Minaya.  Kota Donahue MD

## 2018-07-06 LAB
BASOPHILS # BLD AUTO: 0 X10E3/UL (ref 0–0.2)
BASOPHILS NFR BLD AUTO: 1 %
BUN SERPL-MCNC: 13 MG/DL (ref 8–27)
BUN/CREAT SERPL: 17 (ref 12–28)
CALCIUM SERPL-MCNC: 10.4 MG/DL (ref 8.7–10.3)
CHLORIDE SERPL-SCNC: 104 MMOL/L (ref 96–106)
CO2 SERPL-SCNC: 24 MMOL/L (ref 20–29)
CREAT SERPL-MCNC: 0.77 MG/DL (ref 0.57–1)
EOSINOPHIL # BLD AUTO: 0.2 X10E3/UL (ref 0–0.4)
EOSINOPHIL NFR BLD AUTO: 4 %
ERYTHROCYTE [DISTWIDTH] IN BLOOD BY AUTOMATED COUNT: 13.5 % (ref 12.3–15.4)
GLUCOSE SERPL-MCNC: 90 MG/DL (ref 65–99)
HCT VFR BLD AUTO: 35.9 % (ref 34–46.6)
HGB BLD-MCNC: 11.7 G/DL (ref 11.1–15.9)
IMM GRANULOCYTES # BLD: 0 X10E3/UL (ref 0–0.1)
IMM GRANULOCYTES NFR BLD: 0 %
LYMPHOCYTES # BLD AUTO: 1.2 X10E3/UL (ref 0.7–3.1)
LYMPHOCYTES NFR BLD AUTO: 21 %
MCH RBC QN AUTO: 31.6 PG (ref 26.6–33)
MCHC RBC AUTO-ENTMCNC: 32.6 G/DL (ref 31.5–35.7)
MCV RBC AUTO: 97 FL (ref 79–97)
MONOCYTES # BLD AUTO: 0.5 X10E3/UL (ref 0.1–0.9)
MONOCYTES NFR BLD AUTO: 9 %
NEUTROPHILS # BLD AUTO: 3.6 X10E3/UL (ref 1.4–7)
NEUTROPHILS NFR BLD AUTO: 65 %
PLATELET # BLD AUTO: 246 X10E3/UL (ref 150–379)
POTASSIUM SERPL-SCNC: 4.2 MMOL/L (ref 3.5–5.2)
RBC # BLD AUTO: 3.7 X10E6/UL (ref 3.77–5.28)
SODIUM SERPL-SCNC: 143 MMOL/L (ref 134–144)
WBC # BLD AUTO: 5.6 X10E3/UL (ref 3.4–10.8)

## 2018-07-09 NOTE — PROGRESS NOTES
Recommending that patient reconsider and agree to see the [facility] infectious disease specialist as they could see her sooner.   If not we will schedule with the above provider as noted

## 2018-07-09 NOTE — PROGRESS NOTES
Patient given lab resx. Asked as to when infectious dis doc at [facility]  will review on case by case basis, and notify her.

## 2018-07-13 ENCOUNTER — TELEPHONE (OUTPATIENT)
Dept: FAMILY MEDICINE CLINIC | Age: 73
End: 2018-07-13

## 2018-07-13 NOTE — TELEPHONE ENCOUNTER
Staff message from [facility]-     Pt is requesting a call back in reference to scheduling appointments for an ultrasound, the hematologist and with an infectious disease specialist.(Pt has been hospitalized twice for blood poisoning) P [phone]

## 2018-07-13 NOTE — TELEPHONE ENCOUNTER
Suri calls to request an order to have home health remove pt's pic line since Dr. Major Code will be out of town when this needs to be removed. Moisés Schuster states we can call her w/ a verbal order, since Dr. Major Code won't be in office until 7/16/2018.     Best number for Moisés Schuster is

## 2018-07-13 NOTE — TELEPHONE ENCOUNTER
Spoke w/Suri of [facility], [id number], verbal order given per Dr. Selma Beauchamp for [facility] to remove pt's pic line at home.

## 2018-07-16 ENCOUNTER — DOCUMENTATION ONLY (OUTPATIENT)
Dept: FAMILY MEDICINE CLINIC | Age: 73
End: 2018-07-16

## 2018-07-16 NOTE — PROGRESS NOTES
Got the patient an appointment with [facility] infectious disease in [place] they do accept patients insurance which is one reason she wanted [facility] faxed over all records and [facility] records [phone] she will be seeing Dr Raul Tobias address [address] [place] [address] first available appointment is 10/31/2018 at [address] [place] [address]. Will mail the patient appointment information

## 2018-07-16 NOTE — TELEPHONE ENCOUNTER
SABRINA Curiel has talked to this patient and documented her call elsewhere. No further action is required at this time.

## 2018-07-16 NOTE — PROGRESS NOTES
Spoke with the patient about appointments for US scheduled Wednesday which she needs to reschedule and appointment for [facility] for Liver issue also informed her we still have not heard from infectious disease so I am thinking they are not going to take her on as a new patient I told her they were to have called her and us if they could have given her an appointment.  She asked me to send her information to [facility] and [facility] Infectious disease I told her I would do but they are both scheduling far out and more immediate cases go first

## 2018-07-17 NOTE — TELEPHONE ENCOUNTER
Nikita of [employer] calls to ask if pt has had her last dose of iv antibiotic and d/c'd her picc line.   Best number is

## 2018-07-17 NOTE — TELEPHONE ENCOUNTER
Left detailed message for Nikita w[facility] to call [facility] where IV abx were prescribed, and/or home health care agency for date and info on removal of picc line; advised her to call us if any further questions

## 2018-07-27 DIAGNOSIS — E04.2 MULTIPLE THYROID NODULES: Primary | ICD-10-CM

## 2018-08-27 PROBLEM — K70.30 ALCOHOLIC CIRRHOSIS OF LIVER WITHOUT ASCITES (HCC): Status: ACTIVE | Noted: 2018-08-27

## 2018-09-24 DIAGNOSIS — E04.1 THYROID NODULE: ICD-10-CM

## 2018-10-03 DIAGNOSIS — I87.2 VENOUS INSUFFICIENCY: ICD-10-CM

## 2018-10-03 DIAGNOSIS — M79.89 RIGHT LEG SWELLING: ICD-10-CM

## 2018-10-03 RX ORDER — TORSEMIDE 20 MG/1
TABLET ORAL
Qty: 30 TAB | Refills: 0 | Status: SHIPPED | OUTPATIENT
Start: 2018-10-03 | End: 2018-10-24 | Stop reason: SDUPTHER

## 2018-10-03 NOTE — LETTER
10/4/2018 Ms. Nielsen Tyler [address] [employer] [facility] [id number] Dear Ms. Cayetano Emmanuel missed you! Please call our office at [phone] and schedule a follow-up appointment, due this month, for your continued care. Sincerely, Richard Koehler MD

## 2018-10-03 NOTE — TELEPHONE ENCOUNTER
Torsemide refilled times 1 month  Patient was supposed to come back to see me in August but has not scheduled this  Please remind her to reschedule her follow-up with me within the next month

## 2018-12-07 ENCOUNTER — TELEPHONE (OUTPATIENT)
Dept: FAMILY MEDICINE CLINIC | Age: 73
End: 2018-12-07

## 2018-12-07 NOTE — TELEPHONE ENCOUNTER
Patient asking for a nurse to call her back. She wants to know when her last pneumonia shot was and what kind it was.

## 2018-12-11 NOTE — TELEPHONE ENCOUNTER
I have called and talked to this patient, I have given her the dates of her Pneumococcal vaccines (23 and 13) for her records.

## 2018-12-21 ENCOUNTER — TELEPHONE (OUTPATIENT)
Dept: FAMILY MEDICINE CLINIC | Age: 73
End: 2018-12-21

## 2018-12-21 NOTE — TELEPHONE ENCOUNTER
Patient has developed a yeast infection from the antibiotics. Can you please call her in something to [address] [place] [address] Dr LINDA sanders to treat this?

## 2018-12-21 NOTE — TELEPHONE ENCOUNTER
I have called and left a detailed message for this patient, Diflucan called in for the patient, she should avoid taking it with Hydroxyzine. If any further questions of concerns she should call the office.

## 2018-12-21 NOTE — TELEPHONE ENCOUNTER
Call pt, I have called in [facility] but have pt avoid taking it with Hydroxyzine

## 2021-03-30 ENCOUNTER — TELEPHONE (OUTPATIENT)
Dept: PSYCHIATRY | Age: 76
End: 2021-03-30

## 2021-03-30 DIAGNOSIS — F31.81 BIPOLAR II DISORDER (HCC): Primary | ICD-10-CM

## 2021-05-03 DIAGNOSIS — F90.0 ATTENTION DEFICIT HYPERACTIVITY DISORDER, INATTENTIVE TYPE: ICD-10-CM

## 2021-05-03 RX ORDER — METHYLPHENIDATE HYDROCHLORIDE 20 MG/1
20 TABLET ORAL 2 TIMES DAILY
Qty: 60 TAB | Refills: 0 | Status: SHIPPED | OUTPATIENT
Start: 2021-05-03 | End: 2021-05-11 | Stop reason: SDUPTHER

## 2021-05-05 RX ORDER — VENLAFAXINE HYDROCHLORIDE 150 MG/1
150 CAPSULE, EXTENDED RELEASE ORAL DAILY
Qty: 30 CAP | Refills: 0 | Status: SHIPPED | OUTPATIENT
Start: 2021-05-05 | End: 2021-05-11 | Stop reason: SDUPTHER

## 2021-05-11 ENCOUNTER — HOSPITAL ENCOUNTER (OUTPATIENT)
Dept: BEHAVIORAL/MENTAL HEALTH | Age: 76
Discharge: HOME | End: 2021-05-11
Payer: MEDICARE

## 2021-05-11 DIAGNOSIS — F90.0 ATTENTION DEFICIT HYPERACTIVITY DISORDER, INATTENTIVE TYPE: ICD-10-CM

## 2021-05-11 PROCEDURE — 99443 PR PHYS/QHP TELEPHONE EVALUATION 21-30 MIN: CPT | Performed by: PSYCHIATRY & NEUROLOGY

## 2021-05-11 RX ORDER — METHYLPHENIDATE HYDROCHLORIDE 20 MG/1
20 TABLET ORAL 2 TIMES DAILY
Qty: 60 TAB | Refills: 0 | Status: SHIPPED | OUTPATIENT
Start: 2021-07-02 | End: 2021-08-11 | Stop reason: SDUPTHER

## 2021-05-11 RX ORDER — HYDROXYZINE PAMOATE 25 MG/1
25 CAPSULE ORAL
Qty: 60 CAP | Refills: 3 | Status: SHIPPED | OUTPATIENT
Start: 2021-05-11 | End: 2021-08-11 | Stop reason: SDUPTHER

## 2021-05-11 RX ORDER — METHYLPHENIDATE HYDROCHLORIDE 20 MG/1
20 TABLET ORAL 2 TIMES DAILY
Qty: 60 TAB | Refills: 0 | Status: SHIPPED | OUTPATIENT
Start: 2021-06-02 | End: 2021-08-11 | Stop reason: SDUPTHER

## 2021-05-11 RX ORDER — VENLAFAXINE HYDROCHLORIDE 150 MG/1
150 CAPSULE, EXTENDED RELEASE ORAL DAILY
Qty: 30 CAP | Refills: 5 | Status: SHIPPED | OUTPATIENT
Start: 2021-05-11

## 2021-05-11 NOTE — PROGRESS NOTES
Consent: The patient and/or their healthcare decision maker is aware that they may receive a bill for this audio only encounter, depending on their insurance coverage, and has provided verbal consent to proceed: Yes.     INTERVAL HISTORY (Audio Only):  Ms. Jose C Bullock is a 15-year-old  white female following up with me 4 months since her last appointment regarding a history of Bipolar Disorder (predominantly with depressive episodes), a history of ADHD, and some mild cognitive impairment. She's been reasonably stable on the med regimen noted below, even dealing with 2 knee replacements. During that time the Effexor was decreased and her meds simplified by other MD's, but the Ritalin was increased to 20 mg tid although she's consistently only taken the Ritalin about twice/day, at most. She was fairly stable at the last 5 appointments.      She states that she's still been feeling \"OK\", and actually sounds brighter and not dysphoric at all. However, she's somewhat more focused on the Ritalin and wanting to go back to taking it tid. I tried to explain that she wasn't averaging even 2/day, but she doesn't seem to process what I'm telling her. Also never took the Belsomra after reading the package insert, but went back on the Doxepin and even took 100 mg a number of times (!). I continued to discuss Rx issues and what's safe. She will try the Belsomra for at least a week or more to see if it works. If not, may restart Doxepin at 50 mg or consider another option. Still dealing with some pain for following the knee Sx revision at [facility]. Does feel less flat on the lower dose of Effexor and she's otherwise very pleased with the current med regimen. She denies any SE's with the meds at all.       CURRENT MEDICATIONS:  1. Effexor XR 150 mg daily  2. Ritalin IR 20 mg bid  3. Vistaril 25 mg bid prn--takes this 4-5/week  4. Belsomra 10 mg qhs prn--only took one dose  5.  Doxepin 50 mg qhs--she restarted taking 100 mg some nights      MENTAL STATUS EXAM:  Ms. Brantley Kawasaki was noted to be pleasant and cooperative, and exhibited a reasonably full and bright affect with an appropriate range. She again denied feeling significantly depressed or dysphoric, and denied any feelings of hopelessness, suicidal thinking, or passive thoughts about wanting to die. Neha Diaz was no evidence of any efrain or hypomania at all, and she was not restless or fidgety or hyperactive. There was no evidence of psychosis such as hallucinations or delusions.  Her judgment and insight appeared to be reasonably good, and her cognitive functioning shows the same degree of very mild deficits--mild short-term memory and processing issues.       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 Axis III:          Hypertension, bilateral knee replacements, history of back surgery, hyperlipidemia.       PLAN:  1. Continue the Methylphenidate IR at 20 mg bid--#60 dated 6/2 and 7/2/21.  2. Continue the Effexor XR at 150 mg daily--#30 with 5 refills (30 day).   3. Retry the Belsomra at 10 mg qhs prn--has 3 months of refills (30 day). 4. Continue the Vistaril at 25 mg bid prn--#60 with 3 refills. 5. Continue therapy with Stephenie Garg weekly. 6. She will follow up with me in the next 3 months, sooner if needed.     I affirm this is a Patient-Initiated-Episode with a patient who has not had a related appointment within my department in the past 7 days or scheduled within the next 24 hours. Total Time: 28 minutes  Note: not billable if the call serves to triage the patient into an appointment for the relevant concern. Patient was evaluated by phone call and had no access to a computer or smart phone. Chart reviewed.  Evaluated and management per the note above. POS: patient at home; provider in office.

## 2021-08-11 ENCOUNTER — HOSPITAL ENCOUNTER (OUTPATIENT)
Dept: BEHAVIORAL/MENTAL HEALTH | Age: 76
Discharge: HOME | End: 2021-08-11
Payer: MEDICAID

## 2021-08-11 DIAGNOSIS — F90.0 ATTENTION DEFICIT HYPERACTIVITY DISORDER (ADHD), PREDOMINANTLY INATTENTIVE TYPE: Primary | ICD-10-CM

## 2021-08-11 DIAGNOSIS — F90.0 ATTENTION DEFICIT HYPERACTIVITY DISORDER, INATTENTIVE TYPE: ICD-10-CM

## 2021-08-11 PROCEDURE — 99214 OFFICE O/P EST MOD 30 MIN: CPT | Performed by: PSYCHIATRY & NEUROLOGY

## 2021-08-11 RX ORDER — METHYLPHENIDATE HYDROCHLORIDE 20 MG/1
20 TABLET ORAL 2 TIMES DAILY
Qty: 60 TABLET | Refills: 0 | Status: SHIPPED | OUTPATIENT
Start: 2021-10-15

## 2021-08-11 RX ORDER — METHYLPHENIDATE HYDROCHLORIDE 20 MG/1
20 TABLET ORAL 2 TIMES DAILY
Qty: 60 TABLET | Refills: 0 | Status: SHIPPED | OUTPATIENT
Start: 2021-09-15

## 2021-08-11 RX ORDER — METHYLPHENIDATE HYDROCHLORIDE 20 MG/1
20 TABLET ORAL 2 TIMES DAILY
Qty: 60 TABLET | Refills: 0 | Status: SHIPPED | OUTPATIENT
Start: 2021-08-16

## 2021-08-11 RX ORDER — HYDROXYZINE PAMOATE 25 MG/1
25 CAPSULE ORAL
Qty: 60 CAPSULE | Refills: 2 | Status: SHIPPED | OUTPATIENT
Start: 2021-08-11

## 2021-08-11 NOTE — PROGRESS NOTES
Consent: The patient and/or their healthcare decision maker is aware that they may receive a bill for this audio only encounter, depending on their insurance coverage, and has provided verbal consent to proceed: Yes.     INTERVAL HISTORY (Audio Only):  Ms. Lorene Zaragoza is a 28-year-old  white female following up with me 3 months since her last appointment regarding a history of Bipolar Disorder (predominantly with depressive episodes), a history of ADHD, and some mild cognitive impairment. She's been reasonably stable on the med regimen noted below, even dealing with 2 knee replacements. During that time the Effexor was decreased and her meds simplified by other MD's, but the Ritalin was increased to 20 mg tid although she's consistently only taken the Ritalin about twice/day, at most. She's been relatively stable at the last 6 appointments.      She states that she's been feeling \"OK\", and she again sounds reasonably bright and euthymic, but she says she's still not sleeping well at all and feels she needs to take the Ritalin 3x/day. She (again) just kept focusing on wanting to take the Ritalin tid even though she never took it that often even when it was prescribed at that dose. She otherwise appeared to be doing well affectively and functionally. No overt anxiety was noted, and she also denied having any hypomanic sx's, or any notable dysphoria. I eventually had to suggest she try another provider as I wouldn't increase the dose of the Ritalin and she agreed to look into that. Doesn't want another sleep aide as none have ever had much impact on her and she tends to over take meds when she doesn't get a suitable response. She denies any SE's with the meds at all.       CURRENT MEDICATIONS:  1. Effexor XR 150 mg daily  2. Ritalin IR 20 mg bid  3. Vistaril 25 mg bid prn--takes this 4-5/week  4.  10 mg qhs prn--only took one dose  5.  50 mg qhs--was taking 100 mg some nights      MENTAL STATUS EXAM:  Ms. Lorene Zaragoza was noted to be pleasant and cooperative, and exhibited a reasonably full and bright affect with an appropriate range. She again denied feeling significantly depressed or dysphoric, and denied any feelings of hopelessness, suicidal thinking, or passive thoughts about wanting to die. Meche Finders was no evidence of any efrain or hypomania at all, and she was not restless or fidgety or hyperactive. There was no evidence of psychosis such as hallucinations or delusions.  Her judgment and insight appeared to be reasonably good, except re: her attachment to the Ritalin. Her cognitive functioning shows the same degree of very mild deficits--mild short-term memory and processing issues.       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 Axis III:          Hypertension, bilateral knee replacements, history of back surgery, hyperlipidemia.       PLAN:  1. Continue the Methylphenidate IR at 20 mg bid--#60 dated 8/16, 9/15, and 10/15/21.  2. Continue the Effexor XR at 150 mg daily--has 3 months of refills (30 day).   3. Continue the Vistaril at 25 mg bid prn--#60 with 2 refills. 4. Continue therapy with Paige Hanley weekly. 5. She'll try to transfer her care to another provider Sanket Bergeron). If not, she'll reschedule with me in 3 months.     I affirm this is a Patient-Initiated-Episode with a patient who has not had a related appointment within my department in the past 7 days or scheduled within the next 24 hours. Total Time: 25 minutes  Note: not billable if the call serves to triage the patient into an appointment for the relevant concern. Patient was evaluated by phone call and had no access to a computer or smart phone. Chart reviewed.  Evaluated and management per the note above. POS: patient at home; provider in office.

## 2024-09-09 NOTE — ADDENDUM NOTE
Addended by: Oliver Lynn on: 6/27/2017 05:06 PM     Modules accepted: Orders <-- Click to add NO pertinent Family History

## 2025-04-08 NOTE — PROGRESS NOTES
Aparna Sampson is a 67 y.o. female who presents to the office today with the following:  Chief Complaint   Patient presents with    Follow-up     1 mo, med change       Allergies   Allergen Reactions    Levaquin [Levofloxacin] Other (comments)     Muscle weakness    Percocet [Oxycodone-Acetaminophen] Other (comments)     hallucinate  halucinations      Statins-Hmg-Coa Reductase Inhibitors Other (comments)     Muscle weakness       Current Outpatient Prescriptions   Medication Sig    triamcinolone acetonide (KENALOG) 0.1 % topical cream Apply  to affected area two (2) times daily as needed for Skin Irritation. use thin layer    acyclovir (ZOVIRAX) 5 % ointment Apply  to affected area every three (3) hours.  gabapentin (NEURONTIN) 100 mg capsule Take  by mouth three (3) times daily.  torsemide (DEMADEX) 20 mg tablet Take 1 tab for leg swelling PRN once or twice weekly  Indications: Edema    nortriptyline (PAMELOR) 25 mg capsule Take 1 Cap by mouth nightly.  methylphenidate HCl 20 mg cb24 Take 20 mg by mouth two (2) times a day. Max Daily Amount: 40 mg    eszopiclone (LUNESTA) 2 mg tablet Take 1 Tab by mouth nightly as needed for Sleep. Max Daily Amount: 2 mg.  venlafaxine-SR (EFFEXOR XR) 150 mg capsule Take 1 Cap by mouth two (2) times a day.  ESTRING 2 mg (7.5 mcg /24 hour) vaginal ring Insert 2 mg into vagina.  cycloSPORINE (RESTASIS) 0.05 % ophthalmic emulsion RESTASIS 0.05 % EMUL    ammonium lactate (LAC-HYDRIN) 12 % lotion rub in to affected area well twice daily    cholecalciferol (VITAMIN D3) 1,000 unit tablet Take 2 Tabs by mouth daily.  lisinopril (PRINIVIL, ZESTRIL) 20 mg tablet Take 1 Tab by mouth daily.  potassium chloride (K-DUR, KLOR-CON) 20 mEq tablet Take 1 Tab by mouth daily.  spironolactone (ALDACTONE) 50 mg tablet Take 1 Tab by mouth daily.     tretinoin (RETIN-A) 0.1 % topical cream APPLY TO AFFECTED AREA EVERY NIGHT    rOPINIRole (REQUIP) 1 mg tablet Continue replacement  Check TSH today to confirm adequate dosing   Lab Results   Component Value Date    [id number] 39 11/14/2024    [id number] 55 07/18/2023        Take 0.5 Tabs by mouth three (3) times daily. (Patient taking differently: Take 0.5 mg by mouth three (3) times daily. Indications: Restless Legs Syndrome, pt takes 1mg QHS)    fenofibrate nanocrystallized (TRICOR) 145 mg tablet TAKE ONE TABLET BY MOUTH DAILY    calcium carbonate-vitamin D3 600 mg(1,500mg) -800 unit tab Take 600 mg by mouth daily. No current facility-administered medications for this visit. Past Medical History:   Diagnosis Date    Actinic keratosis     ADHD (attention deficit hyperactivity disorder)     Arthritis     Bipolar affective disorder ([facility] [address]     Dr. Pedro Murray Colon cancer [facility])     polyps on scope 2/17    Depression     GERD (gastroesophageal reflux disease)     Insomnia     Osteoporosis     Dexa 9/6/16       Past Surgical History:   Procedure Laterality Date    HX BACK SURGERY      HX COLECTOMY  1982    HX GI      HX HEENT      HX MALIGNANT SKIN LESION EXCISION         History   Smoking Status    Former Smoker    Packs/day: 1.00    Quit date: 3/5/1970   Smokeless Tobacco    Never Used       Family History   Problem Relation Age of Onset   24 [facility] Cancer Father      melanoma, larynx    Cancer Mother     Hypertension Mother     Cancer Maternal Aunt      pancreatic    Cancer Maternal Grandmother      pancreatic         History of Present Illness:  Patient here for follow-up from her routine visit last month. Please refer to note 8/30    Patient with a long history of chronic lower extremity edema and venous stasis changes. She has been evaluated by vascular surgery in the past on several occasions. Most recently winter 2017. She has had workup in the past including venous Doppler ultrasounds and MARISSA exams. I am still trying to get the consult note from her most recent vascular surgeon the patient tells me they did studies of both her veins and arteries and told her there are no surgical issues here. She is on Spironolactone torsemide.   She has support hose is that she supposed to be using. She is aware of the importance of elevating her legs. I did see her last month as noted. She was having some ongoing complaints. I did stress importance of support hose. We also increased to her torsemide to twice weekly as needed. She states her symptoms are stable over time. She does get some dry itchy skin from the swelling in her legs. I did write for some triamcinolone cream for that today. Patient does have hypertension. Blood pressure remains in good control on her current regimen. Recent lab work did reveal some mild hypercalcemia. Follow-up studies reveal her calcium improved stable over time and near normal at 10.8. Ionized calcium 5.9. Her PTH level phosphorus level and vitamin D all normal.  I suspect the elevation calcium is secondary to her diuretic use and we will continue to follow. She has held her oral calcium replacement    Patient has a mammogram ordered. She has not gone yet but states this is scheduled    She tells me she did schedule an appointment to see her gynecologist coming up the first part of October. She continues to follow with her back specialist and they are trying to set up some injections for her.     She did receive the flu shot today    She does continue to follow with psychiatry who adjust her medications        Review of Systems:      Review of systems negative except as noted above    Physical Exam:  Visit Vitals    /73 (BP 1 Location: Right arm, BP Patient Position: Sitting)    Pulse (!) 101    Temp 98.2 °F (36.8 °C) (Oral)    Resp 18    Ht 5' 6\" (1.676 m)    Wt 185 lb (83.9 kg)    SpO2 95%    BMI 29.86 kg/m2     Vitals:    09/26/17 1436   BP: 108/73   BP 1 Location: Right arm   BP Patient Position: Sitting   Pulse: (!) 101   Resp: 18   Temp: 98.2 °F (36.8 °C)   TempSrc: Oral   SpO2: 95%   Weight: 185 lb (83.9 kg)   Height: 5' 6\" (1.676 m)     Patient no acute distress vital signs stable as above   affect was normal  Lower extremity exam unchanged from previous. Patient had some dependent rubor with good capillary refill. She had some venous stasis dermatitis on her lower leg/calf. No calf tenderness. Negative Homans sign. She was only wearing the support hose on the right lower extremity today      Assessment/Plan:  1. Localized edema  Stable over time. She is going to continue with her current medications. I encouraged use of her support hose. We did discuss options. I could have her seen by vascular surgery again. She does not feel this would be helpful since she has been seen by several in the past which I agree with. We could repeat her venous Doppler ultrasounds. She would prefer not to especially as her symptoms have not changed and previous ultrasound was negative. She stated at that point she wanted to see how things went with the increased dose of diuretics. This indicates that she did not increase her dose at the last visit as we discussed. I am again requesting previous records from her vascular surgeon. I do plan to see her back after the first of the year sooner if her symptoms worsen at all  2. Venous insufficiency    - triamcinolone acetonide (KENALOG) 0.1 % topical cream; Apply  to affected area two (2) times daily as needed for Skin Irritation. use thin layer  Dispense: 120 g; Refill: 2    3. Hypercalcemia  Improved secondary to diuretic use    4. Essential hypertension  In good control on her current regimen    5. Venous stasis dermatitis of both lower extremities    - triamcinolone acetonide (KENALOG) 0.1 % topical cream; Apply  to affected area two (2) times daily as needed for Skin Irritation. use thin layer  Dispense: 120 g; Refill: 2    6.  Encounter for immunization    - Influenza virus vaccine (FLUZONE HIGH DOSE) 65 years and older (67594)    Patient Instructions   Same meds  Elevate legs  Support hose  TAC cream for itchy rash on legs  Call if you want to return [facility]    Get memogram  Follow up GYN          Continue current therapy plan except for indicated above. Verbal and written instructions (see AVS) provided.  Patient expresses understanding of diagnosis and treatment plan. Follow-up Disposition:  Return in about 5 months (around 2/26/2018). Tran Bhat.  Gonzalez Quinones MD        She has held her oriented

## 2025-05-06 NOTE — TELEPHONE ENCOUNTER
Patient has been using Retin-A on a regular basis.   This was started by her dermatologist  Patient tells me it was for her history of skin cancer    Her insurance will no longer pay for the Retin-A as skin cancer is not an medically accepted indication for the use of Retin-A    At this point patient's only option would be to return to dermatology for their evaluation and recommendations as they may be able to petition her insurance company for payment     patient will be contacted and I will refer her back to dermatology if she would like Recent Labs     05/04/25  0459 05/05/25  0431   WBC 8.63 7.31     Resolved